# Patient Record
Sex: FEMALE | Race: ASIAN | NOT HISPANIC OR LATINO | Employment: OTHER | URBAN - METROPOLITAN AREA
[De-identification: names, ages, dates, MRNs, and addresses within clinical notes are randomized per-mention and may not be internally consistent; named-entity substitution may affect disease eponyms.]

---

## 2022-05-26 ENCOUNTER — OFFICE VISIT (OUTPATIENT)
Dept: DERMATOLOGY | Facility: CLINIC | Age: 67
End: 2022-05-26
Payer: MEDICARE

## 2022-05-26 VITALS — BODY MASS INDEX: 27.71 KG/M2 | WEIGHT: 132 LBS | HEIGHT: 58 IN | TEMPERATURE: 98 F

## 2022-05-26 DIAGNOSIS — M33.90 DERMATOMYOSITIS (HCC): ICD-10-CM

## 2022-05-26 DIAGNOSIS — D22.9 MULTIPLE MELANOCYTIC NEVI: Primary | ICD-10-CM

## 2022-05-26 DIAGNOSIS — D18.01 CHERRY ANGIOMA: ICD-10-CM

## 2022-05-26 DIAGNOSIS — R21 RASH: ICD-10-CM

## 2022-05-26 PROCEDURE — 99203 OFFICE O/P NEW LOW 30 MIN: CPT | Performed by: DERMATOLOGY

## 2022-05-26 RX ORDER — LEVOTHYROXINE SODIUM 0.12 MG/1
125 TABLET ORAL DAILY
COMMUNITY
Start: 2022-04-18 | End: 2022-07-17

## 2022-05-26 RX ORDER — CLOBETASOL PROPIONATE 0.46 MG/ML
SOLUTION TOPICAL
COMMUNITY
Start: 2021-11-30 | End: 2022-08-10

## 2022-05-26 RX ORDER — RALOXIFENE HYDROCHLORIDE 60 MG/1
60 TABLET, FILM COATED ORAL DAILY
COMMUNITY
Start: 2022-02-18

## 2022-05-26 NOTE — PROGRESS NOTES
[facility] Note     Patient Name: Destiny Bhatti  Encounter Date: 5/26/2022     Have you been cared for by a [facility] in the last 3 years and, if so, which one? No    · Have you traveled outside of the [address] [place] [address] in the past 3 months or outside of the [facility] area in the last 2 weeks? No     May we call your Preferred Phone number to discuss your specific medical information? Yes     May we leave a detailed message that includes your specific medical information? Yes      Today's Chief Concerns:   Concern #1:  Face rash   Concern #2:      Past Medical History:  Have you personally ever had or currently have any of the following? · Skin cancer (such as Melanoma, Basal Cell Carcinoma, Squamous Cell Carcinoma? (If Yes, please provide more detail)- No  · Eczema: YES  · Psoriasis: No  · HIV/AIDS: No  · Hepatitis B or C: No  · Tuberculosis: No  · Systemic Immunosuppression such as Diabetes, Biologic or Immunotherapy, Chemotherapy, Organ Transplantation, Bone Marrow Transplantation (If YES, please provide more detail): No  · Radiation Treatment (If YES, please provide more detail): No  · Any other major medical conditions/concerns? (If Yes, which types)- No    Social History:     What is/was your primary occupation?       What are your hobbies/past-times? Family History:  Have any of your "first degree relatives" (parent, brother, sister, or child) had any of the following       · Skin cancer such as Melanoma or Merkel Cell Carcinoma or Pancreatic Cancer? No  · Eczema, Asthma, Hay Fever or Seasonal Allergies: No  · Psoriasis or Psoriatic Arthritis: No  · Do any other medical conditions seem to run in your family? If Yes, what condition and which relatives?   No    Current Medications:   (please update all dermatological medications before printing patient's AVS!)      Current Outpatient Medications:     clobetasol (TEMOVATE) 0 05 % external solution, , Disp: , Rfl:     levothyroxine 125 mcg tablet, Take 125 mcg by mouth daily, Disp: , Rfl:     raloxifene (EVISTA) 60 mg tablet, Take 60 mg by mouth daily, Disp: , Rfl:       Review of Systems:  Have you recently had or currently have any of the following? If YES, what are you doing for the problem? · Fever, chills or unintended weight loss: No  · Sudden loss or change in your vision: No  · Nausea, vomiting or blood in your stool: No  · Painful or swollen joints: No  · Wheezing or cough: No  · Changing mole or non-healing wound: No  · Nosebleeds: No  · Excessive sweating: No  · Easy or prolonged bleeding? No  · Over the last 2 weeks, how often have you been bothered by the following problems? · Taking little interest or pleasure in doing things: 1 - Not at All  · Feeling down, depressed, or hopeless: 1 - Not at All  · Rapid heartbeat with epinephrine:  No    · FEMALES ONLY:    · Are you pregnant or planning to become pregnant? No  · Are you currently or planning to be nursing or breast feeding? N/A    · Any known allergies? No Known Allergies      Physical Exam:     Was a chaperone (Derm Clinical Assistant) present throughout the entire Physical Exam? Yes     Did the Dermatology Team specifically  the patient on the importance of a Full Skin Exam to be sure that nothing is missed clinically?  Yes}  o Did the patient ultimately request or accept a Full Skin Exam?  Yes  o Did the patient specifically refuse to have the areas "under-the-bra" examined by the Dermatologist? No  o Did the patient specifically refuse to have the areas "under-the-underwear" examined by the Dermatologist? No    CONSTITUTIONAL:   Vitals:    05/26/22 1333   Temp: 98 °F (36 7 °C)   Weight: 59 9 kg (132 lb)   Height: 4' 10" (1 473 m)       PSYCH: Normal mood and affect  EYES: Normal conjunctiva  ENT: Normal lips and oral mucosa  CARDIOVASCULAR: No edema  RESPIRATORY: Normal respirations  HEME/LYMPH/IMMUNO: No regional lymphadenopathy except as noted below in "ASSESSMENT AND PLAN BY DIAGNOSIS"    SKIN:  FULL ORGAN SYSTEM EXAM   Hair, Scalp, Ears, Face Normal except as noted below in Assessment   Neck, Cervical Chain Nodes Normal except as noted below in Assessment   Right Arm/Hand/Fingers Normal except as noted below in Assessment   Left Arm/Hand/Fingers Normal except as noted below in Assessment   Chest/Axillae Viewed areas Normal except as noted below in Assessment   Abdomen, Umbilicus Normal except as noted below in Assessment   Back/Spine Normal except as noted below in Assessment   Groin/Genitalia/Buttocks NOT EXAMINED   Right Leg, Foot, Toes Normal except as noted below in Assessment   Left Leg, Foot, Toes Normal except as noted below in Assessment        Assessment and Plan by Diagnosis:    History of Present Condition:     Duration:  How long has this been an issue for you?    o  2 years   Location Affected:  Where on the body is this affecting you?    o  face, neck, back   Quality:  Is there any bleeding, pain, itch, burning/irritation, or redness associated with the skin lesion? o  bleeding, redness, irritation, itchy   Severity:  Describe any bleeding, pain, itch, burning/irritation, or redness on a scale of 1 to 10 (with 10 being the worst)  o  N/A   Timing:  Does this condition seem to be there pretty constantly or do you notice it more at specific times throughout the day?     o  comes and goes   Context:  Have you ever noticed that this condition seems to be associated with specific activities you do?    o  Eczema    Modifying Factors:    o Anything that seems to make the condition worse?    -  unsure   o What have you tried to do to make the condition better?    -  Triamcinolone, Hydrocortisone, Epiceram, Cerave, Cetaphil, Clobetasol solution, Primecrolimus cream, Doxycycline, Vanicream, Smartlotion, Eucrisa, Dupixent    Associated Signs and Symptoms:  Does this skin lesion seem to be associated with any of the following:  o  SL AMB DERM SIGNS AND SYMPTOMS: Redness     MELANOCYTIC NEVI ("Moles")    Physical Exam:   Anatomic Location Affected:   Mostly on sun-exposed areas of the trunk and extremities   Morphological Description:  Scattered, 1-4mm round to ovoid, symmetrical-appearing, even bordered, skin colored to dark brown macules/papules, mostly in sun-exposed areas   Pertinent Positives:   Pertinent Negatives: Additional History of Present Condition:      Assessment and Plan:  Based on a thorough discussion of this condition and the management approach to it (including a comprehensive discussion of the known risks, side effects and potential benefits of treatment), the patient (family) agrees to implement the following specific plan:   When outside we recommend using a wide brim hat, sunglasses, long sleeve and pants, sunscreen with SPF 50+ with reapplication every 2 hours, or SPF specific clothing    Benign, reassured   Annual skin check     Melanocytic Nevi  Melanocytic nevi ("moles") are tan or brown, raised or flat areas of the skin which have an increased number of melanocytes  Melanocytes are the cells in our body which make pigment and account for skin color  Some moles are present at birth (I e , "congenital nevi"), while others come up later in life (i e , "acquired nevi")  The sun can stimulate the body to make more moles  Sunburns are not the only thing that triggers more moles  Chronic sun exposure can do it too  Clinically distinguishing a healthy mole from melanoma may be difficult, even for experienced dermatologists  The "ABCDE's" of moles have been suggested as a means of helping to alert a person to a suspicious mole and the possible increased risk of melanoma  The suggestions for raising alert are as follows:    Asymmetry: Healthy moles tend to be symmetric, while melanomas are often asymmetric    Asymmetry means if you draw a line through the mole, the two halves do not match in color, size, shape, or surface texture  Asymmetry can be a result of rapid enlargement of a mole, the development of a raised area on a previously flat lesion, scaling, ulceration, bleeding or scabbing within the mole  Any mole that starts to demonstrate "asymmetry" should be examined promptly by a board certified dermatologist      Border: Healthy moles tend to have discrete, even borders  The border of a melanoma often blends into the normal skin and does not sharply delineate the mole from normal skin  Any mole that starts to demonstrate "uneven borders" should be examined promptly by a board certified dermatologist      Color: Healthy moles tend to be one color throughout  Melanomas tend to be made up of different colors ranging from dark black, blue, white, or red  Any mole that demonstrates a color change should be examined promptly by a board certified dermatologist      Diameter: Healthy moles tend to be smaller than 0 6 cm in size; an exception are "congenital nevi" that can be larger  Melanomas tend to grow and can often be greater than 0 6 cm (1/4 of an inch, or the size of a pencil eraser)  This is only a guideline, and many normal moles may be larger than 0 6 cm without being unhealthy  Any mole that starts to change in size (small to bigger or bigger to smaller) should be examined promptly by a board certified dermatologist      Evolving: Healthy moles tend to "stay the same "  Melanomas may often show signs of change or evolution such as a change in size, shape, color, or elevation  Any mole that starts to itch, bleed, crust, burn, hurt, or ulcerate or demonstrate a change or evolution should be examined promptly by a board certified dermatologist         Jolene Hassan    Physical Exam:   Anatomic Location Affected:  trunk   Morphological Description:  Scattered cherry red, 1-4 mm papules   Pertinent Positives:   Pertinent Negatives:     Additional History of Present Condition:      Assessment and Plan:  Based on a thorough discussion of this condition and the management approach to it (including a comprehensive discussion of the known risks, side effects and potential benefits of treatment), the patient (family) agrees to implement the following specific plan:   Monitor for changes   Benign, reassured    Assessment and Plan:    Cherry angioma, also known as Jazmyn Vazquez spots, are benign vascular skin lesions  A "cherry angioma" is a firm red, blue or purple papule, 0 1-1 cm in diameter  When thrombosed, they can appear black in colour until evaluated with a dermatoscope when the red or purple colour is more easily seen  Cherry angioma may develop on any part of the body but most often appear on the scalp, face, lips and trunk  An angioma is due to proliferating endothelial cells; these are the cells that line the inside of a blood vessel  Angiomas can arise in early life or later in life; the most common type of angioma is a cherry angioma  Cherry angiomas are very common in males and females of any age or race  They are more noticeable in white skin than in skin of colour  They markedly increase in number from about the age of 36  There may be a family history of similar lesions  Eruptive cherry angiomas have been rarely reported to be associated with internal malignancy  The cause of angiomas is unknown  Genetic analysis of cherry angiomas has shown that they frequently carry specific somatic missense mutations in the GNAQ and GNA11 (Q209H) genes, which are involved in other vascular and melanocytic proliferations  RASH/ POSSIBLE DERMATOMYOSITIS     Physical Exam:   (Anatomic Location); (Size and Morphological Description); (Differential Diagnosis):  o A; Face, neck, arms with  blanching red patches, papules on knukles   Pertinent Positives: Pictures taken today in the office      Pertinent Negatives: no muscle weakness noted    Additional History of Present Condition:  Present for two years  Currently located on face, neck  Described as red, irritated and itchy  Treatments have included: Dupixent, triamcinolone 0 1% cream, hydrocortisone 2 5% Primecrolimus cream, doxycycline, Epiceran, Desoride, Vanicream, Cerave, Cetaphil, Eucresa 2% ointment, smartlotion, clobetasol solution  Patient washes her body with Dove with cucumber  Patient has also had patch testing done which was inconclusive  Assessment and Plan:  Based on a thorough discussion of this condition and the management approach to it (including a comprehensive discussion of the known risks, side effects and potential benefits of treatment), the patient (family) agrees to implement the following specific plan:   Complete ordered blood work that was ordered for you today   At this time we recommend that you continue the same topical regimen that you are currently doing  You may also continue the [address] as prescribed      Uptodate on routine cancer screenings                    Scribe Attestation    I,:  Angelita Simental am acting as a scribe while in the presence of the attending physician :       I,:  Silke Ramey MD personally performed the services described in this documentation    as scribed in my presence :

## 2022-05-26 NOTE — PATIENT INSTRUCTIONS
MELANOCYTIC NEVI ("Moles")    Assessment and Plan:  Based on a thorough discussion of this condition and the management approach to it (including a comprehensive discussion of the known risks, side effects and potential benefits of treatment), the patient (family) agrees to implement the following specific plan:  When outside we recommend using a wide brim hat, sunglasses, long sleeve and pants, sunscreen with SPF 72+ with reapplication every 2 hours, or SPF specific clothing   Benign, reassured  Annual skin check     Melanocytic Nevi  Melanocytic nevi ("moles") are tan or brown, raised or flat areas of the skin which have an increased number of melanocytes  Melanocytes are the cells in our body which make pigment and account for skin color  Some moles are present at birth (I e , "congenital nevi"), while others come up later in life (i e , "acquired nevi")  The sun can stimulate the body to make more moles  Sunburns are not the only thing that triggers more moles  Chronic sun exposure can do it too  Clinically distinguishing a healthy mole from melanoma may be difficult, even for experienced dermatologists  The "ABCDE's" of moles have been suggested as a means of helping to alert a person to a suspicious mole and the possible increased risk of melanoma  The suggestions for raising alert are as follows:    Asymmetry: Healthy moles tend to be symmetric, while melanomas are often asymmetric  Asymmetry means if you draw a line through the mole, the two halves do not match in color, size, shape, or surface texture  Asymmetry can be a result of rapid enlargement of a mole, the development of a raised area on a previously flat lesion, scaling, ulceration, bleeding or scabbing within the mole  Any mole that starts to demonstrate "asymmetry" should be examined promptly by a board certified dermatologist      Border: Healthy moles tend to have discrete, even borders    The border of a melanoma often blends into the normal skin and does not sharply delineate the mole from normal skin  Any mole that starts to demonstrate "uneven borders" should be examined promptly by a board certified dermatologist      Color: Healthy moles tend to be one color throughout  Melanomas tend to be made up of different colors ranging from dark black, blue, white, or red  Any mole that demonstrates a color change should be examined promptly by a board certified dermatologist      Diameter: Healthy moles tend to be smaller than 0 6 cm in size; an exception are "congenital nevi" that can be larger  Melanomas tend to grow and can often be greater than 0 6 cm (1/4 of an inch, or the size of a pencil eraser)  This is only a guideline, and many normal moles may be larger than 0 6 cm without being unhealthy  Any mole that starts to change in size (small to bigger or bigger to smaller) should be examined promptly by a board certified dermatologist      Evolving: Healthy moles tend to "stay the same "  Melanomas may often show signs of change or evolution such as a change in size, shape, color, or elevation  Any mole that starts to itch, bleed, crust, burn, hurt, or ulcerate or demonstrate a change or evolution should be examined promptly by a board certified dermatologist         PETERSEN ANGIOMAS    Assessment and Plan:  Based on a thorough discussion of this condition and the management approach to it (including a comprehensive discussion of the known risks, side effects and potential benefits of treatment), the patient (family) agrees to implement the following specific plan:  Monitor for changes  Benign, reassured    Assessment and Plan:    Cherry angioma, also known as Albino Linda spots, are benign vascular skin lesions  A "cherry angioma" is a firm red, blue or purple papule, 0 1-1 cm in diameter  When thrombosed, they can appear black in colour until evaluated with a dermatoscope when the red or purple colour is more easily seen   [place] Meredith angioma may develop on any part of the body but most often appear on the scalp, face, lips and trunk  An angioma is due to proliferating endothelial cells; these are the cells that line the inside of a blood vessel  Angiomas can arise in early life or later in life; the most common type of angioma is a cherry angioma  Cherry angiomas are very common in males and females of any age or race  They are more noticeable in white skin than in skin of colour  They markedly increase in number from about the age of 36  There may be a family history of similar lesions  Eruptive cherry angiomas have been rarely reported to be associated with internal malignancy  The cause of angiomas is unknown  Genetic analysis of cherry angiomas has shown that they frequently carry specific somatic missense mutations in the GNAQ and GNA11 (Q209H) genes, which are involved in other vascular and melanocytic proliferations  RASH/ POSSIBLE DERMATOMYOSITIS     Assessment and Plan:  Based on a thorough discussion of this condition and the management approach to it (including a comprehensive discussion of the known risks, side effects and potential benefits of treatment), the patient (family) agrees to implement the following specific plan:  Complete ordered blood work that was ordered for you today  At this time we recommend that you continue the same topical regimen that you are currently doing  You may also continue the [address] as prescribed

## 2022-06-21 LAB
ALDOLASE SERPL-CCNC: 5.4 U/L
ANA SER QL IF: NEGATIVE
CK SERPL-CCNC: 57 U/L (ref 29–143)
CRP SERPL-MCNC: 2.5 MG/L
EJ AB SER QL: NOT DETECTED
ENA JO1 AB SER IA-ACNC: NORMAL AI
KU AB SER QL: NOT DETECTED
LDH1 CFR SERPL ELPH: 20 % (ref 19–38)
LDH2 CFR SERPL ELPH: 36 % (ref 30–43)
LDH3 CFR SERPL ELPH: 28 % (ref 16–26)
LDH4 CFR SERPL ELPH: 12 % (ref 3–12)
LDH5 CFR SERPL ELPH: 4 % (ref 3–14)
MI2 AB SER QL: NOT DETECTED
OJ AB SER QL: NOT DETECTED
PL12 AB SER QL: NOT DETECTED
PL7 AB SER QL: NOT DETECTED
SRP AB SERPL QL: NOT DETECTED

## 2022-06-30 ENCOUNTER — OFFICE VISIT (OUTPATIENT)
Dept: DERMATOLOGY | Facility: CLINIC | Age: 67
End: 2022-06-30
Payer: MEDICARE

## 2022-06-30 DIAGNOSIS — R21 RASH AND NONSPECIFIC SKIN ERUPTION: Primary | ICD-10-CM

## 2022-06-30 PROCEDURE — 88312 SPECIAL STAINS GROUP 1: CPT | Performed by: STUDENT IN AN ORGANIZED HEALTH CARE EDUCATION/TRAINING PROGRAM

## 2022-06-30 PROCEDURE — 11105 PUNCH BX SKIN EA SEP/ADDL: CPT | Performed by: DERMATOLOGY

## 2022-06-30 PROCEDURE — 11104 PUNCH BX SKIN SINGLE LESION: CPT | Performed by: DERMATOLOGY

## 2022-06-30 PROCEDURE — 88350 IMFLUOR EA ADDL 1ANTB STN PX: CPT | Performed by: STUDENT IN AN ORGANIZED HEALTH CARE EDUCATION/TRAINING PROGRAM

## 2022-06-30 PROCEDURE — 88305 TISSUE EXAM BY PATHOLOGIST: CPT | Performed by: STUDENT IN AN ORGANIZED HEALTH CARE EDUCATION/TRAINING PROGRAM

## 2022-06-30 PROCEDURE — 88300 SURGICAL PATH GROSS: CPT | Performed by: STUDENT IN AN ORGANIZED HEALTH CARE EDUCATION/TRAINING PROGRAM

## 2022-06-30 PROCEDURE — 88313 SPECIAL STAINS GROUP 2: CPT | Performed by: STUDENT IN AN ORGANIZED HEALTH CARE EDUCATION/TRAINING PROGRAM

## 2022-06-30 PROCEDURE — 88346 IMFLUOR 1ST 1ANTB STAIN PX: CPT | Performed by: DERMATOLOGY

## 2022-06-30 NOTE — PROGRESS NOTES
PROCEDURE NOTE:  PUNCH BIOPSY      Performing Physician: Gurjit Abdi, supervised by Dr Fly Staley  Anatomic Location; Clinical Description with size (cm); Pre-Op Diagnosis:      SPECIMEN A; Skin; Anatomic Location: Right 3rd DIP; Procedure/Protocol: Skin Specimen (submit in [facility]):Punch Biopsy (when a punch biopsy tool is used; simple closure is included) (CPT 31479; each additional punch biopsy is C PT 26024) 73y o  year old  Female with a Morphological Description: pink nodules on interphalangeal joints and violaceous patches on face; patient has history of muscle aches Differential Diagnosis and/or Specific Clinical Question: concern for Gottron's papules/ dermatomyositis       SPECIMEN B; Skin; Anatomic Location: Right 3rd DIP; Procedure/Protocol: Skin Specimen (submit in [facility]):Punch Biopsy (when a punch biopsy tool is used; simple closure is included) (CPT 00670; each additional punch biopsy is CPT 63867) 73y o  year old  Female with a Morphological Description: pink nodules on interphalangeal joints and violaceous patches on faceDifferential Diagnosis and/or Specific Clinical Question: concern for Gottron's papules/ dermatomyositis SEND FOR DIF                     Anesthesia: 1% xylocaine with epi       Topical anesthesia: None       Indications: To indicate diagnosis and management plan  Procedure Details     Patient informed of the risks (including bleeding,scaring and infection) and benefits of the procedure explained  Verbal and written informed consent obtained  The area was prepped and draped in the usual fashion  Anesthesia was obtained with 1% lidocaine with epinephrine  The skin was then stretched perpendicular to the skin tension lines and a punch biopsy to an appropriate sampling depth was obtained with a 4 mm punch with a forceps and iris scissors  Hemostasis was obtained with 5-0 Ethilon x 2 sutures in each spot        Complications:  None      Specimen has been sent for review by Dermatopathology  Plan:  1  Instructed to keep the wound dry and covered for 24-48h and clean thereafter  2  Warning signs of infection were reviewed  3  Recommended that the patient use acetaminophen as needed for pain  4  Sutures if any should be removed in 10 days      Standard post-procedure care has been explained and has been included in written form within the patient's copy of Informed Consent  The patient was seen and discussed with Dr Jeff Nurse       RTC: will call patient with biopsy results and schedule next steps in treatment accordingly     Wade Barreto  Dermatology PGY-3 Resident Physician

## 2022-06-30 NOTE — PATIENT INSTRUCTIONS
Plan:  1  Instructed to keep the wound dry and covered for 24-48h and clean thereafter  2  Warning signs of infection were reviewed  3  Recommended that the patient use acetaminophen as needed for pain  4   Sutures if any should be removed in 10 days    VaniCream sunscreen SPF 50+

## 2022-07-12 LAB — MISCELLANEOUS LAB TEST RESULT: NORMAL

## 2022-07-14 ENCOUNTER — TELEPHONE (OUTPATIENT)
Dept: DERMATOLOGY | Facility: CLINIC | Age: 67
End: 2022-07-14

## 2022-07-20 ENCOUNTER — TELEPHONE (OUTPATIENT)
Dept: DERMATOLOGY | Facility: CLINIC | Age: 67
End: 2022-07-20

## 2022-07-20 DIAGNOSIS — M33.90 DERMATOMYOSITIS (HCC): Primary | ICD-10-CM

## 2022-07-20 RX ORDER — HYDROXYCHLOROQUINE SULFATE 200 MG/1
200 TABLET, FILM COATED ORAL 2 TIMES DAILY WITH MEALS
Qty: 180 TABLET | Refills: 0 | Status: SHIPPED | OUTPATIENT
Start: 2022-07-20 | End: 2022-11-18

## 2022-08-10 DIAGNOSIS — M33.90 DERMATOMYOSITIS (HCC): ICD-10-CM

## 2022-08-10 RX ORDER — CLOBETASOL PROPIONATE 0.5 MG/G
OINTMENT TOPICAL 2 TIMES DAILY
Qty: 60 G | Refills: 0 | Status: CANCELLED | OUTPATIENT
Start: 2022-08-10

## 2022-08-10 RX ORDER — BETAMETHASONE DIPROPIONATE 0.5 MG/G
OINTMENT TOPICAL
Qty: 30 G | Refills: 0 | OUTPATIENT
Start: 2022-08-10

## 2022-08-10 RX ORDER — MOMETASONE FUROATE 1 MG/ML
SOLUTION TOPICAL
Qty: 60 ML | Refills: 0 | OUTPATIENT
Start: 2022-08-10

## 2022-08-10 RX ORDER — CLOBETASOL PROPIONATE 0.46 MG/ML
SOLUTION TOPICAL 2 TIMES DAILY
Qty: 50 ML | Refills: 0 | Status: CANCELLED | OUTPATIENT
Start: 2022-08-10

## 2022-08-15 ENCOUNTER — TELEPHONE (OUTPATIENT)
Dept: DERMATOLOGY | Facility: CLINIC | Age: 67
End: 2022-08-15

## 2022-08-15 NOTE — TELEPHONE ENCOUNTER
In touch with patient on 8/14/22  Developing new onset rash, pruritic, on face and trunk  Stop plaquneil  Triamcinolone 0 1% 2x/day  Clinic this week, likely biopsy  Will need to prescribe replacement for plaquenil  Please schedule for 8/16 at 10:30 in [place] with me  Overbook   OVS- rash, punch biopsy

## 2022-08-16 ENCOUNTER — OFFICE VISIT (OUTPATIENT)
Dept: DERMATOLOGY | Facility: CLINIC | Age: 67
End: 2022-08-16
Payer: MEDICARE

## 2022-08-16 VITALS — WEIGHT: 130 LBS | TEMPERATURE: 98.2 F | HEIGHT: 58 IN | BODY MASS INDEX: 27.29 KG/M2

## 2022-08-16 DIAGNOSIS — R21 RASH: ICD-10-CM

## 2022-08-16 DIAGNOSIS — M33.90 DERMATOMYOSITIS (HCC): Primary | ICD-10-CM

## 2022-08-16 PROCEDURE — 88350 IMFLUOR EA ADDL 1ANTB STN PX: CPT | Performed by: STUDENT IN AN ORGANIZED HEALTH CARE EDUCATION/TRAINING PROGRAM

## 2022-08-16 PROCEDURE — 88313 SPECIAL STAINS GROUP 2: CPT | Performed by: STUDENT IN AN ORGANIZED HEALTH CARE EDUCATION/TRAINING PROGRAM

## 2022-08-16 PROCEDURE — 88300 SURGICAL PATH GROSS: CPT | Performed by: STUDENT IN AN ORGANIZED HEALTH CARE EDUCATION/TRAINING PROGRAM

## 2022-08-16 PROCEDURE — 11104 PUNCH BX SKIN SINGLE LESION: CPT | Performed by: DERMATOLOGY

## 2022-08-16 PROCEDURE — 88305 TISSUE EXAM BY PATHOLOGIST: CPT | Performed by: STUDENT IN AN ORGANIZED HEALTH CARE EDUCATION/TRAINING PROGRAM

## 2022-08-16 PROCEDURE — 88346 IMFLUOR 1ST 1ANTB STAIN PX: CPT | Performed by: DERMATOLOGY

## 2022-08-16 PROCEDURE — 11105 PUNCH BX SKIN EA SEP/ADDL: CPT | Performed by: DERMATOLOGY

## 2022-08-16 PROCEDURE — 99213 OFFICE O/P EST LOW 20 MIN: CPT | Performed by: DERMATOLOGY

## 2022-08-16 RX ORDER — HYDROCORTISONE 25 MG/ML
1 LOTION TOPICAL 2 TIMES DAILY
COMMUNITY

## 2022-08-16 RX ORDER — PREDNISONE 20 MG/1
TABLET ORAL
Qty: 33 TABLET | Refills: 0 | Status: SHIPPED | OUTPATIENT
Start: 2022-08-16 | End: 2022-09-05

## 2022-08-16 RX ORDER — TRIAMCINOLONE ACETONIDE 1 MG/G
CREAM TOPICAL
COMMUNITY
Start: 2022-08-14

## 2022-08-16 NOTE — PATIENT INSTRUCTIONS
Assessment and Plan:  Based on a thorough discussion of this condition and the management approach to it (including a comprehensive discussion of the known risks, side effects and potential benefits of treatment), the patient (family) agrees to implement the following specific plan:  Punch biopsy done in office today   Start prednisone take 60 mg for 5 days, then 40 mg for 5 days, then 20 mg for 5 days, then 10 mg for 5 days  Remove sutures in 14 days     Plan:  1  Instructed to keep the wound dry and covered for 24-48h and clean thereafter  2  Warning signs of infection were reviewed  3  Recommended that the patient use acetaminophen as needed for pain  4  Sutures if any should be removed in 14 days      Standard post-procedure care has been explained and has been included in written form within the patient's copy of Informed Consent

## 2022-08-16 NOTE — PROGRESS NOTES
[facility] Follow Up Note    Patient Name: Sonny Hashimoto  Encounter Date: 08/16/2022    Today's Chief Concerns:  [facility] Concern #1:  Rash       Current Medications:    Current Outpatient Medications:     betamethasone, augmented, (DIPROLENE) 0 05 % ointment, Apply topically 2 (two) times a day To fingers as needed, Disp: 50 g, Rfl: 0    clobetasol (TEMOVATE) 0 05 % external solution, Apply topically 2 (two) times a day To scalp as needed, Disp: 50 mL, Rfl: 0    hydrocortisone 2 5 % lotion, Apply 1 application topically 2 (two) times a day, Disp: , Rfl:     levothyroxine 125 mcg tablet, Take 125 mcg by mouth daily, Disp: , Rfl:     raloxifene (EVISTA) 60 mg tablet, Take 60 mg by mouth daily, Disp: , Rfl:     triamcinolone (KENALOG) 0 1 % ointment, Apply 1 application topically 2 (two) times a day To rash on face and body, Disp: 454 g, Rfl: 1    hydroxychloroquine (PLAQUENIL) 200 mg tablet, Take 1 tablet (200 mg total) by mouth 2 (two) times a day with meals (Patient not taking: Reported on 8/16/2022), Disp: 180 tablet, Rfl: 0    triamcinolone (KENALOG) 0 1 % cream, , Disp: , Rfl:     CONSTITUTIONAL:   Vitals:    08/16/22 1045   Temp: 98 2 °F (36 8 °C)   Weight: 59 kg (130 lb)   Height: 4' 10" (1 473 m)             Specific Alerts:    Have you been seen by a [facility] Dermatologist in the last 3 years? YES    Are you pregnant or planning to become pregnant? No    Are you currently or planning to be nursing or breast feeding? No    No Known Allergies    May we call your Preferred Phone number to discuss your specific medical information? YES    May we leave a detailed message that includes your specific medical information? YES    Have you traveled outside of the [facility] in the past 3 months? No    Do you currently have a pacemaker or defibrillator?  No    Do you have any artificial heart valves, joints, plates, screws, rods, stents, pins, etc? No   - If Yes, were any placed within the last 2 years? Do you require any medications prior to a surgical procedure? No   - If Yes, for which procedure? N/a    - If Yes, what medications to you require? N/a     Are you taking any medications that cause you to bleed more easily ("blood thinners") No    Have you ever experienced a rapid heartbeat with epinephrine? No    Have you ever been treated with "gold" (gold sodium thiomalate) therapy? No    [facility] can help with wrinkles, "laugh lines," facial volume loss, "double chin," "love handles," age spots, and more  Are you interested in learning today about some of the skin enhancement procedures that we offer? (If Yes, please provide more detail) No    Review of Systems:  Have you recently had or currently have any of the following?     · Fever or chills: No  · Night Sweats: No  · Headaches: No  · Weight Gain: No  · Weight Loss: No  · Blurry Vision: No  · Nausea: No  · Vomiting: No  · Diarrhea: No  · Blood in Stool: No  · Abdominal Pain: No  · Itchy Skin: YES  · Painful Joints: No  · Swollen Joints: No  · Muscle Pain: No  · Irregular Mole: No  · Sun Burn: No  · Dry Skin: YES  · Skin Color Changes: No  · Scar or Keloid: No  · Cold Sores/Fever Blisters: No  · Bacterial Infections/MRSA: No  · Anxiety: No  · Depression: No  · Suicidal or Homicidal Thoughts: No      PSYCH: Normal mood and affect  EYES: Normal conjunctiva  ENT: Normal lips and oral mucosa  CARDIOVASCULAR: No edema  RESPIRATORY: Normal respirations  HEME/LYMPH/IMMUNO:  No regional lymphadenopathy except as noted below in ASSESSMENT AND PLAN BY DIAGNOSIS    FULL ORGAN SYSTEM SKIN EXAM (SKIN)   Hair, Scalp, Ears, Face Normal except as noted below in Assessment   Neck, Cervical Chain Nodes Normal except as noted below in Assessment   Right Arm/Hand/Fingers Normal except as noted below in Assessment   Left Arm/Hand/Fingers Normal except as noted below in Assessment   Chest/Axillae Viewed areas Normal except as noted below in Assessment   Abdomen, Umbilicus Normal except as noted below in Assessment   Back/Spine Normal except as noted below in Assessment   Groin/Genitalia/Buttocks    Right Leg, Foot, Toes Normal except as noted below in Assessment   Left Leg, Foot, Toes Normal except as noted below in Assessment       RASH (SUSPECT DRUG REACTION VS WORSENING DERMATOMYOSITIS) on back > chest, arms, legs    Physical Exam:   (Anatomic Location); (Size and Morphological Description); (Differential Diagnosis):  · A: Left lateral thigh; erythematous blanching papules with some coalescing into larger plaques; Differential Diagnosis and/or Specific Clinical Question:drug eruption versus dermatomyositis  · B: Left medial thigh; erythematous blanching papules with some coalescing into larger plaques; Differential Diagnosis and/or Specific Clinical Question:drug eruption versus dermatomyositis    Pertinent Positives:   Pertinent Negatives: Additional History of Present Condition:     Assessment and Plan:  Based on a thorough discussion of this condition and the management approach to it (including a comprehensive discussion of the known risks, side effects and potential benefits of treatment), the patient (family) agrees to implement the following specific plan:   Punch biopsy done in office today  Written consent obtained from patient  Will call with biopsy results   Start prednisone taper: take 60 mg for 5 days, then 40 mg for 5 days, then 20 mg for 5 days, then 10 mg for 5 days   Continue with topical triamcinolone 0 1% ointment   Stopped plaquenil   Can consider starting methotrexate vs cyclosporine vs mycophenolate mofetil pending biopsy results      Remove sutures in 14 days     PROCEDURE NOTE:  PUNCH BIOPSY      Performing Physician: Dr Jo    Anatomic Location; Clinical Description with size (cm); Pre-Op Diagnosis:    A; right thigh HE; erythematous blanching papules; diffdx; drug irruption versus dermatomyositis  B; right thigh DIF; erythematous blanching papules; diffdx; drug irruption versus dermatomyositis        Anesthesia: 1% Lidocaine HCL      Topical anesthesia: None       Indications: To indicate diagnosis and management plan  Procedure Details     Patient informed of the risks (including bleeding,scaring and infection) and benefits of the procedure explained  Verbal and written informed consent obtained  The area was prepped and draped in the usual fashion  Anesthesia was obtained with 1% lidocaine with epinephrine  The skin was then stretched perpendicular to the skin tension lines and a punch biopsy to an appropriate sampling depth was obtained with a 4 mm punch with a forceps and iris scissors  Hemostasis was obtained with 4-0 Ethilon x 2 sutures  Complications:  None      Specimen has been sent for review by Dermatopathology  Plan:  1  Instructed to keep the wound dry and covered for 24-48h and clean thereafter  2  Warning signs of infection were reviewed  3  Recommended that the patient use acetaminophen as needed for pain  4  Sutures if any should be removed in 14 days      Standard post-procedure care has been explained and has been included in written form within the patient's copy of Informed Consent          Scribe Attestation    I,:  Jose A Pérez MA am acting as a scribe while in the presence of the attending physician :       I,:  Janie Lynn DO personally performed the services described in this documentation    as scribed in my presence :       Gigi Peralta DO

## 2022-08-26 ENCOUNTER — TELEPHONE (OUTPATIENT)
Dept: DERMATOLOGY | Facility: CLINIC | Age: 67
End: 2022-08-26

## 2022-08-26 DIAGNOSIS — M33.90 DERMATOMYOSITIS (HCC): Primary | ICD-10-CM

## 2022-08-26 NOTE — TELEPHONE ENCOUNTER
[place] of radiology called states before Ct scan can be performed they need a blood work order for bun and creatine can be faxed to [phone]

## 2022-09-09 ENCOUNTER — TELEPHONE (OUTPATIENT)
Dept: DERMATOLOGY | Age: 67
End: 2022-09-09

## 2022-09-09 NOTE — TELEPHONE ENCOUNTER
[facility] called to clarify miscellaneous test order they received     Informed him after reading order it is a myositis specific 11 antibody panel (test code) 08198

## 2022-09-15 LAB
BUN SERPL-MCNC: 18 MG/DL (ref 7–25)
BUN/CREAT SERPL: NORMAL (CALC) (ref 6–22)
CALCIUM SERPL-MCNC: 9.1 MG/DL (ref 8.6–10.4)
CHLORIDE SERPL-SCNC: 104 MMOL/L (ref 98–110)
CO2 SERPL-SCNC: 29 MMOL/L (ref 20–32)
CREAT SERPL-MCNC: 0.58 MG/DL (ref 0.5–1.05)
EJ AB SER QL: <11 SI
ENA JO1 AB SER QL IB: <11 SI
GFR/BSA.PRED SERPLBLD CYS-BASED-ARV: 100 ML/MIN/1.73M2
GLUCOSE SERPL-MCNC: 87 MG/DL (ref 65–99)
OJ AB SER QL IB: <11 SI
PL12 AB SER QL IB: <11 SI
PL7 AB SER QL IB: <11 SI
POTASSIUM SERPL-SCNC: 4.1 MMOL/L (ref 3.5–5.3)
SL AMB MDA-5 AB: <11 SI
SL AMB MI-2 ALPHA AB: <11 SI
SL AMB MI-2 BETA AB: <11 SI
SL AMB NXP-2 AB: <11 SI
SL AMB TIF-1Y AB: 37 SI
SODIUM SERPL-SCNC: 141 MMOL/L (ref 135–146)
SRP AB SERPL QL IB: 17 SI

## 2022-09-19 ENCOUNTER — TELEPHONE (OUTPATIENT)
Dept: DERMATOLOGY | Facility: CLINIC | Age: 67
End: 2022-09-19

## 2022-09-19 NOTE — TELEPHONE ENCOUNTER
Dr Rina Nagel, Patient is good to go for CT scan  No PA is needed with Medicare and [employer] follows Medicare guidelines   Thank you

## 2022-09-19 NOTE — TELEPHONE ENCOUNTER
Please assist for PA for CT chest abdomen pelvis with and without contrast  Diagnosis dermatomyositis, malignancy screening  She is scheduled for tomorrow am, please let me know if she needs to reschedule   Thanks so much!!

## 2022-09-27 ENCOUNTER — TELEPHONE (OUTPATIENT)
Dept: DERMATOLOGY | Facility: CLINIC | Age: 67
End: 2022-09-27

## 2022-09-27 NOTE — TELEPHONE ENCOUNTER
[facility] to get [facility] results  I can't see them for some reason

## 2022-09-28 ENCOUNTER — TELEPHONE (OUTPATIENT)
Dept: DERMATOLOGY | Facility: CLINIC | Age: 67
End: 2022-09-28

## 2022-09-28 NOTE — TELEPHONE ENCOUNTER
Called Pt to try to schedule appt for her and explain about the waitlist   Pt asked if Dr González Summers or the clinical staff could call her back and let her know if her ultrasound results have been obtained  I advised we were getting the CT results, however, she stated there are ultrasound results also  Pt would like a callback to schedule an appt as she stated Dr Dalia Ho wants her to come in in October to review both results  Not sure if we have results back and there are no appts available to schedule        Sent message to Dr Adrian Lerner and [facility]

## 2022-10-15 DIAGNOSIS — M33.90 DERMATOMYOSITIS (HCC): ICD-10-CM

## 2022-10-17 RX ORDER — HYDROXYCHLOROQUINE SULFATE 200 MG/1
TABLET, FILM COATED ORAL
Qty: 180 TABLET | Refills: 0 | OUTPATIENT
Start: 2022-10-17

## 2022-10-18 ENCOUNTER — APPOINTMENT (OUTPATIENT)
Dept: LAB | Facility: CLINIC | Age: 67
End: 2022-10-18
Payer: MEDICARE

## 2022-10-18 ENCOUNTER — OFFICE VISIT (OUTPATIENT)
Dept: DERMATOLOGY | Facility: CLINIC | Age: 67
End: 2022-10-18
Payer: MEDICARE

## 2022-10-18 VITALS — HEIGHT: 58 IN | WEIGHT: 130 LBS | BODY MASS INDEX: 27.29 KG/M2 | TEMPERATURE: 97.7 F

## 2022-10-18 DIAGNOSIS — Z11.59 ENCOUNTER FOR SCREENING FOR OTHER VIRAL DISEASES: ICD-10-CM

## 2022-10-18 DIAGNOSIS — M33.90 DERMATOMYOSITIS (HCC): Primary | ICD-10-CM

## 2022-10-18 DIAGNOSIS — M33.90 DERMATOMYOSITIS (HCC): ICD-10-CM

## 2022-10-18 DIAGNOSIS — Z79.899 HIGH RISK MEDICATION USE: Primary | ICD-10-CM

## 2022-10-18 LAB
ALBUMIN SERPL BCP-MCNC: 4.2 G/DL (ref 3.5–5)
ALP SERPL-CCNC: 49 U/L (ref 34–104)
ALT SERPL W P-5'-P-CCNC: 5 U/L (ref 7–52)
ANION GAP SERPL CALCULATED.3IONS-SCNC: 6 MMOL/L (ref 4–13)
AST SERPL W P-5'-P-CCNC: 17 U/L (ref 13–39)
BASOPHILS # BLD AUTO: 0.06 THOUSANDS/ΜL (ref 0–0.1)
BASOPHILS NFR BLD AUTO: 1 % (ref 0–1)
BILIRUB SERPL-MCNC: 0.26 MG/DL (ref 0.2–1)
BUN SERPL-MCNC: 17 MG/DL (ref 5–25)
CALCIUM SERPL-MCNC: 9.6 MG/DL (ref 8.4–10.2)
CHLORIDE SERPL-SCNC: 106 MMOL/L (ref 96–108)
CO2 SERPL-SCNC: 30 MMOL/L (ref 21–32)
CREAT SERPL-MCNC: 0.49 MG/DL (ref 0.6–1.3)
EOSINOPHIL # BLD AUTO: 0.29 THOUSAND/ΜL (ref 0–0.61)
EOSINOPHIL NFR BLD AUTO: 6 % (ref 0–6)
ERYTHROCYTE [DISTWIDTH] IN BLOOD BY AUTOMATED COUNT: 14.3 % (ref 11.6–15.1)
GFR SERPL CREATININE-BSD FRML MDRD: 102 ML/MIN/1.73SQ M
GLUCOSE SERPL-MCNC: 91 MG/DL (ref 65–140)
HBV CORE AB SER QL: NORMAL
HBV SURFACE AB SER-ACNC: <3.1 MIU/ML
HBV SURFACE AG SER QL: NORMAL
HCT VFR BLD AUTO: 43.6 % (ref 34.8–46.1)
HCV AB SER QL: NORMAL
HGB BLD-MCNC: 13.8 G/DL (ref 11.5–15.4)
IMM GRANULOCYTES # BLD AUTO: 0.03 THOUSAND/UL (ref 0–0.2)
IMM GRANULOCYTES NFR BLD AUTO: 1 % (ref 0–2)
LYMPHOCYTES # BLD AUTO: 1.21 THOUSANDS/ΜL (ref 0.6–4.47)
LYMPHOCYTES NFR BLD AUTO: 23 % (ref 14–44)
MCH RBC QN AUTO: 29.4 PG (ref 26.8–34.3)
MCHC RBC AUTO-ENTMCNC: 31.7 G/DL (ref 31.4–37.4)
MCV RBC AUTO: 93 FL (ref 82–98)
MONOCYTES # BLD AUTO: 0.53 THOUSAND/ΜL (ref 0.17–1.22)
MONOCYTES NFR BLD AUTO: 10 % (ref 4–12)
NEUTROPHILS # BLD AUTO: 3.06 THOUSANDS/ΜL (ref 1.85–7.62)
NEUTS SEG NFR BLD AUTO: 59 % (ref 43–75)
NRBC BLD AUTO-RTO: 0 /100 WBCS
PLATELET # BLD AUTO: 289 THOUSANDS/UL (ref 149–390)
PMV BLD AUTO: 10.5 FL (ref 8.9–12.7)
POTASSIUM SERPL-SCNC: 3.9 MMOL/L (ref 3.5–5.3)
PROT SERPL-MCNC: 7.3 G/DL (ref 6.4–8.4)
RBC # BLD AUTO: 4.69 MILLION/UL (ref 3.81–5.12)
SODIUM SERPL-SCNC: 142 MMOL/L (ref 135–147)
WBC # BLD AUTO: 5.18 THOUSAND/UL (ref 4.31–10.16)

## 2022-10-18 PROCEDURE — 86704 HEP B CORE ANTIBODY TOTAL: CPT

## 2022-10-18 PROCEDURE — 99213 OFFICE O/P EST LOW 20 MIN: CPT | Performed by: DERMATOLOGY

## 2022-10-18 PROCEDURE — 85025 COMPLETE CBC W/AUTO DIFF WBC: CPT | Performed by: DERMATOLOGY

## 2022-10-18 PROCEDURE — 87340 HEPATITIS B SURFACE AG IA: CPT

## 2022-10-18 PROCEDURE — 86803 HEPATITIS C AB TEST: CPT

## 2022-10-18 PROCEDURE — 80053 COMPREHEN METABOLIC PANEL: CPT | Performed by: DERMATOLOGY

## 2022-10-18 PROCEDURE — 36415 COLL VENOUS BLD VENIPUNCTURE: CPT | Performed by: DERMATOLOGY

## 2022-10-18 PROCEDURE — 86706 HEP B SURFACE ANTIBODY: CPT

## 2022-10-18 RX ORDER — LEVOTHYROXINE SODIUM 0.12 MG/1
125 TABLET ORAL DAILY
COMMUNITY

## 2022-10-18 RX ORDER — OMEGA-3S/DHA/EPA/FISH OIL/D3 300MG-1000
400 CAPSULE ORAL DAILY
COMMUNITY

## 2022-10-18 RX ORDER — FOLIC ACID 1 MG/1
TABLET ORAL
Qty: 30 TABLET | Refills: 11 | Status: SHIPPED | OUTPATIENT
Start: 2022-10-18 | End: 2022-11-18

## 2022-10-18 NOTE — PROGRESS NOTES
[facility] Follow Up Note    Patient Name: Antoni Tom  Encounter Date: 10/18/2022    Today's Chief Concerns:  • Concern #1:  Follow up dermatomyositis    Current Medications:    Current Outpatient Medications:   •  betamethasone, augmented, (DIPROLENE) 0 05 % ointment, Apply topically 2 (two) times a day To fingers as needed, Disp: 50 g, Rfl: 0  •  clobetasol (TEMOVATE) 0 05 % external solution, Apply topically 2 (two) times a day To scalp as needed, Disp: 50 mL, Rfl: 0  •  hydrocortisone 2 5 % lotion, Apply 1 application topically 2 (two) times a day As needed, Disp: , Rfl:   •  levothyroxine (Synthroid) 125 mcg tablet, Take 125 mcg by mouth daily, Disp: , Rfl:   •  raloxifene (EVISTA) 60 mg tablet, Take 60 mg by mouth daily, Disp: , Rfl:   •  triamcinolone (KENALOG) 0 1 % cream, , Disp: , Rfl:   •  triamcinolone (KENALOG) 0 1 % ointment, Apply 1 application topically 2 (two) times a day To rash on face and body, Disp: 454 g, Rfl: 1  •  hydroxychloroquine (PLAQUENIL) 200 mg tablet, Take 1 tablet (200 mg total) by mouth 2 (two) times a day with meals (Patient not taking: No sig reported), Disp: 180 tablet, Rfl: 0  •  levothyroxine 125 mcg tablet, Take 125 mcg by mouth daily, Disp: , Rfl:     CONSTITUTIONAL:   Vitals:    10/18/22 0830   Temp: 97 7 °F (36 5 °C)   Weight: 59 kg (130 lb)   Height: 4' 10" (1 473 m)       Specific Alerts:    Have you been seen by a [facility] Dermatologist in the last 3 years? YES    Are you pregnant or planning to become pregnant? No    Are you currently or planning to be nursing or breast feeding? No    Allergies   Allergen Reactions   • Hydroxychloroquine Rash       May we call your Preferred Phone number to discuss your specific medical information? YES    May we leave a detailed message that includes your specific medical information? YES    Have you traveled outside of the [facility] in the past 3 months?  No    Do you currently have a pacemaker or defibrillator? No    Do you have any artificial heart valves, joints, plates, screws, rods, stents, pins, etc? No   - If Yes, were any placed within the last 2 years? Do you require any medications prior to a surgical procedure? No   - If Yes, for which procedure? - If Yes, what medications to you require? Are you taking any medications that cause you to bleed more easily ("blood thinners") No    Have you ever experienced a rapid heartbeat with epinephrine? No      Review of Systems:  Have you recently had or currently have any of the following?     · Fever or chills: No  · Night Sweats: No  · Headaches: No  · Weight Gain: No  · Weight Loss: No  · Blurry Vision: No  · Nausea: No  · Vomiting: No  · Diarrhea: No  · Blood in Stool: No  · Abdominal Pain: No  · Itchy Skin: No  · Painful Joints: YES  · Swollen Joints: YES  · Muscle Pain: YES  · Irregular Mole: No  · Sun Burn: No  · Dry Skin: YES  · Skin Color Changes: YES  · Scar or Keloid: No  · Cold Sores/Fever Blisters: No  · Bacterial Infections/MRSA: No  · Anxiety: No  · Depression: No  · Suicidal or Homicidal Thoughts: No      PSYCH: Normal mood and affect  EYES: Normal conjunctiva  ENT: Normal lips and oral mucosa  CARDIOVASCULAR: No edema  RESPIRATORY: Normal respirations  HEME/LYMPH/IMMUNO:  No regional lymphadenopathy except as noted below in ASSESSMENT AND PLAN BY DIAGNOSIS    FULL ORGAN SYSTEM SKIN EXAM (SKIN)   Hair, Scalp, Ears, Face Normal except as noted below in Assessment   Neck, Cervical Chain Nodes Normal except as noted below in Assessment   Right Arm/Hand/Fingers Normal except as noted below in Assessment   Left Arm/Hand/Fingers Normal except as noted below in Assessment   Chest/Axillae Viewed areas Normal except as noted below in Assessment   Abdomen, Umbilicus Normal except as noted below in Assessment   Back/Spine Normal except as noted below in Assessment   Groin Viewed areas Normal except as noted below in Assessment   Right Leg, Foot, Toes Normal except as noted below in Assessment   Left Leg, Foot, Toes Normal except as noted below in Assessment       DERMATOMYOSITIS    Physical Exam:  • Anatomic Location Affected:  Face, arms, legs, trunk, scalp  • Morphological Description:  Diffuse erythematous patches, slight scaling; diffuse xerosis   • Pertinent Negatives: no muscle pain    Additional History of Present Condition:  Patient had rash reaction to hydroxychloroquine and has stopped  Patient is applying betamethasone 0 05% ointment with little relief  Patient also has betamethasone 0 05% ointment, hydrocortisone 2 5% lotion, triamcinolone 0 1% ointment, triamcinolone 0 1% cream   Patient reports extremely dry skin but no itchy  Denies muscle pain  Mammogram scheduled for November  Patient feels if she gets a cut on her skin that it takes a long time to heal      CT A/P was negative; ovarian ultrasound was remarkable for a scar- thinks this may be from having a fibroidectomy in her fallopian tubes/ovaries years ago but still has further evaluation appt scheduled with Ob-GYN  Assessment and Plan:  Based on a thorough discussion of this condition and the management approach to it (including a comprehensive discussion of the known risks, side effects and potential benefits of treatment), the patient (family) agrees to implement the following specific plan:  Use moisturizer like plain Vaseline,  Eucerin,Cerave, Vanicream or Aveeno Cream 3 times a day for the dry skin     •         Discussed with patient that if she were to start having muscle pain she is to notify us  Advised that it is possible you may not develop muscle pain/ aches  Discussed lab results with patient  Informing patient that it is important to keep up with your colonoscopies and mammograms and other cancer screenings  Also that it is important to follow up with PCP to have blood work screenings to make sure blood work in within normal limits       Discussed with patient starting Methotrexate  Along with Methotrexate you will need to take Folic Acid  We will need to check blood work frequently, every    two weeks, until we have you on a stable dose  Methotrexate can cause your immune system to be suppressed which can put you at risk for more frequent    infections  It can take 2 months before notice an improvement with Methotrexate  Also discussed with patient that if Methotrexate is not effective we may    need to discuss IV treatment called IVIG  We recommend that you do not apply any of the topical medications to your skin unless your skin is itchy as long term use can thin your skin  It is extremely    important to apply a moisturizer at least 3 times a day  Methotrexate 2 5 mg, take 6 tablets (15 mg total) once a week  This prescription was sent to your pharmacy  Folic Acid 1 mg tablet take one tablet daily  We sent this in as a prescription but it may not be covered so you would buy it over the counter  · We will call you with your blood results; we will repeat them in 2 weeks  · Follow-up in 2-3 months      What causes dermatomyositis? Dermatomyositis is a rare acquired muscle disease that is accompanied by a rash  It is one of a group of muscle diseases called inflammatory myopathies  Dermatomyositis may affect people of any race, age or sex, although it is twice as common in women than in men  The onset of the disease is most common in those aged 50-70 years  Dermatomyositis is considered one of the connective tissue diseases, like systemic sclerosis and lupus erythematosus  Dermatomyositis is thought to be caused by small vessel damage which in turn affects skin and muscle  Factors that may contribute to its development are listed below    • Genetic predisposition  • Underlying cancer (more likely in older people)  • Autoimmune (immune reaction against self)  • Infectious or toxic agents acting as triggers  • Certain drugs, which include hydroxyurea, penicillamine, statins, quinidine, and phenylbutazone     What are the symptoms of dermatomyositis? The two main groups of symptoms affect the skin and muscles  In many patients, the first sign of dermatomyositis is the presence of a symptomless, itchy or burning rash  The rash often, but not always, develops before the muscle weakness  • Reddish or bluish-purple patches mostly affect sun-exposed areas  • A violaceous (purple) rash may also affect cheeks, nose, shoulders, upper chest, elbows, and outer thighs  • Purple eyelids, which are described as heliotrope, as they resemble the heliotrope flower, Heliotropium peruvianum, which has small purple petals  • A scaly scalp and thinned out hair may occur  • Less commonly, there is poikiloderma, in which the skin is atrophic (pale, thin skin), red(dilated blood vessels) and brown (post-inflammatory pigmentation)  • Purple papules or plaques are found on bony prominences, especially the knuckles (Gottron papules)  • Ragged cuticles and prominent blood vessels on nail folds are best seen by capillaroscopy or dermoscopy  In severe cases, calcinosis can occur  • It presents as hard yellow or white lumps under the skin  • These usually appear on fingers or over joints  • Sometimes these nodules may poke through the skin and ulcerate  • The ulcers may become infected  Muscle weakness may arise at the same time as the dermatomyositis rash, or it may occur weeks, months or years later  Proximal muscles are affected, that is, those closest to the trunk (upper arms, thighs)  The first indication of myositis is when the following everyday movements become difficult  • Climbing stairs or walking  • Rising from a sitting or crouching position  • Lifting objects  • Raising arms above the shoulders, e g  combing hair  • Difficulty swallowing (dysphagia)  Occasionally the affected muscles ache and become tender to touch      How do we diagnose dermatomyositis? The diagnosis of dermatomyositis is usually confirmed by the following tests  • Blood test to detect raised circulating muscle enzymes: creatine kinase (CK) and sometimes aldolase, aspartate aminotransferase (AST) and lactic dehydrogenase (LDH)  • Blood test to detect autoantibodies: non-specific antinuclear antibody (XIMENA) is found in most patients, specific Anti-Mi-2 is found in one quarter and Anti-Barbie-1 in a few, usually those who have lung disease, and are diagnostic of antisynthetase syndrome  • Skin biopsy of the rash: the microscopic appearance of an interface dermatitis is similar to systemic (acute) lupus erythematosus  • Biopsy of an affected muscle  • Electromyography (EMG) testing  • Magnetic resonance imaging (MRI) scan of muscles  In those over 60, full body examination and testing are recommended to look for underlying cancer  Cancer screenings are recommended for up to five years after the initial onset of dermatomyositis in these patients  How do we treat dermatomyositis? The primary aim of treatment is to control the skin disease and muscle disease  An oral corticosteroid such as prednisone in moderate to high dose is the mainstay of medical therapy and is given to slow down the rate of disease progression  Immunosuppressive or cytotoxic drugs may also be used including methotrexate, azathioprine, cyclophosphamide, ciclosporin, mycophenolate, high dose intravenous immunoglobulin and experimentally, biologics such as rituximab   Other important measures in the management of dermatomyositis include:  • Diltiazem, a calcium channel blocker usually prescribed for high blood pressure, may reduce calcinosis  • Colchicine has also been reported to reduce calcinosis  • Hydroxychloroquine may reduce the photosensitive rash  • Avoid excessive sun exposure and use sun protection measures, including sunscreens, to minimize the harmful effects of the sun on already damaged and photosensitive skin  • Bedrest for those with severe inflammation of muscles  • Physical therapy and activity to keep the muscles and joints moving  • Avoid eating food before bedtime and raise the bed head for those with difficulty swallowing    Most patients will require treatment throughout their lifetime, but dermatomyositis completely resolves in about one-in-five patients  Patients who have a disease affecting their heart or lungs, or who also have underlying cancer often require more help from a multidisciplinary team        Scribe Attestation    I,:  Anthony Vela am acting as a scribe while in the presence of the attending physician :       I,:  Hector Reid MD personally performed the services described in this documentation    as scribed in my presence :         The patient was seen and discussed with Dr Hector Reid       RTC: 2-3 months for dermatomyositis follow-up    Ron Abdi  Dermatology PGY-4 Resident Physician

## 2022-10-18 NOTE — PATIENT INSTRUCTIONS
DERMATOMYOSITIS    Assessment and Plan:  Based on a thorough discussion of this condition and the management approach to it (including a comprehensive discussion of the known risks, side effects and potential benefits of treatment), the patient (family) agrees to implement the following specific plan:  Use moisturizer like plain Vaseline,  Eucerin,Cerave, Vanicream or Aveeno Cream 3 times a day for the dry skin             Discussed with patient that if she were to start having muscle pain she is to notify us  Advised that it is possible you may not develop muscle pain/ aches  Discussed lab results with patient  Informing patient that it is important to keep up with your colonoscopies and mammograms and other cancer screenings  Also that it is important to follow up with PCP to have blood work screenings to make sure blood work in within normal limits  Discussed with patient starting Methotrexate  Along with Methotrexate you will need to take Folic Acid  We will need to check blood work frequently, every    two weeks, until we have you on a stable dose  Methotrexate can cause your immune system to be suppressed which can put you at risk for more frequent    infections  It can take 2 months before notice an improvement with Methotrexate  Also discussed with patient that if Methotrexate is not effective we may    need to discuss IV treatment called IVIG  We recommend that you do not apply any of the topical medications to your skin unless your skin is itchy as long term use can thin your skin  It is extremely    important to apply a moisturizer at least 3 times a day  Methotrexate 2 5 mg, take 6 tablets (15 mg total) once a week  This prescription was sent to your pharmacy  Folic Acid 1 mg tablet take one tablet daily  We sent this in as a prescription but it may not be covered so you would buy it over the counter  We will call you with your blood results; we will repeat them in 2 weeks  Follow-up in 2-3 months    What causes dermatomyositis? Dermatomyositis is a rare acquired muscle disease that is accompanied by a rash  It is one of a group of muscle diseases called inflammatory myopathies  Dermatomyositis may affect people of any race, age or sex, although it is twice as common in women than in men  The onset of the disease is most common in those aged 50-70 years  Dermatomyositis is considered one of the connective tissue diseases, like systemic sclerosis and lupus erythematosus  Dermatomyositis is thought to be caused by small vessel damage which in turn affects skin and muscle  Factors that may contribute to its development are listed below  Genetic predisposition  Underlying cancer (more likely in older people)  Autoimmune (immune reaction against self)  Infectious or toxic agents acting as triggers  Certain drugs, which include hydroxyurea, penicillamine, statins, quinidine, and phenylbutazone     What are the symptoms of dermatomyositis? The two main groups of symptoms affect the skin and muscles  In many patients, the first sign of dermatomyositis is the presence of a symptomless, itchy or burning rash  The rash often, but not always, develops before the muscle weakness  Reddish or bluish-purple patches mostly affect sun-exposed areas  A violaceous (purple) rash may also affect cheeks, nose, shoulders, upper chest, elbows, and outer thighs  Purple eyelids, which are described as heliotrope, as they resemble the heliotrope flower, Heliotropium peruvianum, which has small purple petals  A scaly scalp and thinned out hair may occur  Less commonly, there is poikiloderma, in which the skin is atrophic (pale, thin skin), red(dilated blood vessels) and brown (post-inflammatory pigmentation)  Purple papules or plaques are found on bony prominences, especially the knuckles (Gottron papules)    Ragged cuticles and prominent blood vessels on nail folds are best seen by capillaroscopy or dermoscopy  In severe cases, calcinosis can occur  It presents as hard yellow or white lumps under the skin  These usually appear on fingers or over joints  Sometimes these nodules may poke through the skin and ulcerate  The ulcers may become infected  Muscle weakness may arise at the same time as the dermatomyositis rash, or it may occur weeks, months or years later  Proximal muscles are affected, that is, those closest to the trunk (upper arms, thighs)  The first indication of myositis is when the following everyday movements become difficult  Climbing stairs or walking  Rising from a sitting or crouching position  Lifting objects  Raising arms above the shoulders, e g  combing hair  Difficulty swallowing (dysphagia)  Occasionally the affected muscles ache and become tender to touch  How do we diagnose dermatomyositis? The diagnosis of dermatomyositis is usually confirmed by the following tests  Blood test to detect raised circulating muscle enzymes: creatine kinase (CK) and sometimes aldolase, aspartate aminotransferase (AST) and lactic dehydrogenase (LDH)  Blood test to detect autoantibodies: non-specific antinuclear antibody (XIMENA) is found in most patients, specific Anti-Mi-2 is found in one quarter and Anti-Barbie-1 in a few, usually those who have lung disease, and are diagnostic of antisynthetase syndrome  Skin biopsy of the rash: the microscopic appearance of an interface dermatitis is similar to systemic (acute) lupus erythematosus  Biopsy of an affected muscle  Electromyography (EMG) testing  Magnetic resonance imaging (MRI) scan of muscles  In those over 60, full body examination and testing are recommended to look for underlying cancer  Cancer screenings are recommended for up to five years after the initial onset of dermatomyositis in these patients  How do we treat dermatomyositis? The primary aim of treatment is to control the skin disease and muscle disease   An oral corticosteroid such as prednisone in moderate to high dose is the mainstay of medical therapy and is given to slow down the rate of disease progression  Immunosuppressive or cytotoxic drugs may also be used including methotrexate, azathioprine, cyclophosphamide, ciclosporin, mycophenolate, high dose intravenous immunoglobulin and experimentally, biologics such as rituximab  Other important measures in the management of dermatomyositis include:  Diltiazem, a calcium channel blocker usually prescribed for high blood pressure, may reduce calcinosis  Colchicine has also been reported to reduce calcinosis  Hydroxychloroquine may reduce the photosensitive rash  Avoid excessive sun exposure and use sun protection measures, including sunscreens, to minimize the harmful effects of the sun on already damaged and photosensitive skin  Bedrest for those with severe inflammation of muscles  Physical therapy and activity to keep the muscles and joints moving  Avoid eating food before bedtime and raise the bed head for those with difficulty swallowing    Most patients will require treatment throughout their lifetime, but dermatomyositis completely resolves in about one-in-five patients   Patients who have a disease affecting their heart or lungs, or who also have underlying cancer often require more help from a multidisciplinary team

## 2022-11-02 DIAGNOSIS — M33.90 DERMATOMYOSITIS (HCC): ICD-10-CM

## 2022-11-02 RX ORDER — CLOBETASOL PROPIONATE 0.46 MG/ML
SOLUTION TOPICAL 2 TIMES DAILY
Qty: 50 ML | Refills: 0 | Status: SHIPPED | OUTPATIENT
Start: 2022-11-02 | End: 2022-11-08 | Stop reason: SDUPTHER

## 2022-11-08 DIAGNOSIS — M33.90 DERMATOMYOSITIS (HCC): ICD-10-CM

## 2022-11-08 RX ORDER — CLOBETASOL PROPIONATE 0.46 MG/ML
SOLUTION TOPICAL 2 TIMES DAILY
Qty: 50 ML | Refills: 0 | Status: SHIPPED | OUTPATIENT
Start: 2022-11-08

## 2022-11-18 DIAGNOSIS — M33.90 DERMATOMYOSITIS (HCC): ICD-10-CM

## 2022-11-18 NOTE — PROGRESS NOTES
Overall rash improving  Having increased hair loss  Increase methotrexate to 20 mg weekly  Follow up lab in 2 weeks

## 2022-11-22 ENCOUNTER — TELEPHONE (OUTPATIENT)
Dept: DERMATOLOGY | Facility: CLINIC | Age: 67
End: 2022-11-22

## 2022-11-29 NOTE — TELEPHONE ENCOUNTER
Prior authorization was submitted through covermymeds for Clobetasol Propionate 0 05% solution   Key#[id number]

## 2022-12-14 DIAGNOSIS — M33.90 DERMATOMYOSITIS (HCC): ICD-10-CM

## 2022-12-14 RX ORDER — METHOTREXATE 2.5 MG/1
22.5 TABLET ORAL WEEKLY
Qty: 54 TABLET | Refills: 0 | Status: SHIPPED | OUTPATIENT
Start: 2022-12-14 | End: 2022-12-20 | Stop reason: SDUPTHER

## 2022-12-20 ENCOUNTER — DOCUMENTATION (OUTPATIENT)
Dept: DERMATOLOGY | Facility: CLINIC | Age: 67
End: 2022-12-20

## 2022-12-20 DIAGNOSIS — M33.90 DERMATOMYOSITIS (HCC): Primary | ICD-10-CM

## 2022-12-20 DIAGNOSIS — M33.90 DERMATOMYOSITIS (HCC): ICD-10-CM

## 2022-12-20 RX ORDER — METHOTREXATE 2.5 MG/1
22.5 TABLET ORAL WEEKLY
Qty: 36 TABLET | Refills: 2 | Status: SHIPPED | OUTPATIENT
Start: 2022-12-20

## 2022-12-20 RX ORDER — MINOXIDIL 2.5 MG/1
1.25 TABLET ORAL DAILY
Qty: 30 TABLET | Refills: 3 | Status: SHIPPED | OUTPATIENT
Start: 2022-12-20

## 2023-02-08 LAB — HBA1C MFR BLD HPLC: 6 %

## 2023-02-15 ENCOUNTER — DOCUMENTATION (OUTPATIENT)
Dept: DERMATOLOGY | Facility: CLINIC | Age: 68
End: 2023-02-15

## 2023-02-15 DIAGNOSIS — M33.90 DERMATOMYOSITIS (HCC): Primary | ICD-10-CM

## 2023-02-15 RX ORDER — CLOBETASOL PROPIONATE 0.46 MG/ML
SOLUTION TOPICAL 2 TIMES DAILY
Qty: 50 ML | Refills: 5 | Status: SHIPPED | OUTPATIENT
Start: 2023-02-15 | End: 2023-02-20 | Stop reason: SDUPTHER

## 2023-02-20 RX ORDER — CLOBETASOL PROPIONATE 0.46 MG/ML
SOLUTION TOPICAL 2 TIMES DAILY
Qty: 50 ML | Refills: 5 | Status: SHIPPED | OUTPATIENT
Start: 2023-02-20 | End: 2023-02-21 | Stop reason: SDUPTHER

## 2023-02-21 DIAGNOSIS — M33.90 DERMATOMYOSITIS (HCC): ICD-10-CM

## 2023-02-21 RX ORDER — CLOBETASOL PROPIONATE 0.46 MG/ML
SOLUTION TOPICAL 2 TIMES DAILY
Qty: 50 ML | Refills: 5 | Status: SHIPPED | OUTPATIENT
Start: 2023-02-21

## 2023-02-27 ENCOUNTER — TELEPHONE (OUTPATIENT)
Dept: DERMATOLOGY | Facility: CLINIC | Age: 68
End: 2023-02-27

## 2023-03-01 NOTE — TELEPHONE ENCOUNTER
Prior [place] was [place] through [facility] [facility] [id number]

## 2023-03-09 DIAGNOSIS — M33.90 DERMATOMYOSITIS (HCC): ICD-10-CM

## 2023-03-09 RX ORDER — METHOTREXATE 2.5 MG/1
22.5 TABLET ORAL WEEKLY
Qty: 36 TABLET | Refills: 2 | Status: SHIPPED | OUTPATIENT
Start: 2023-03-09 | End: 2023-05-31

## 2023-05-18 DIAGNOSIS — M33.90 DERMATOMYOSITIS (HCC): Primary | ICD-10-CM

## 2023-05-24 LAB
ALBUMIN SERPL-MCNC: 4.2 G/DL (ref 3.6–5.1)
ALBUMIN/GLOB SERPL: 1.5 (CALC) (ref 1–2.5)
ALP SERPL-CCNC: 60 U/L (ref 37–153)
ALT SERPL-CCNC: 7 U/L (ref 6–29)
AST SERPL-CCNC: 15 U/L (ref 10–35)
BASOPHILS # BLD AUTO: 38 CELLS/UL (ref 0–200)
BASOPHILS NFR BLD AUTO: 1 %
BILIRUB SERPL-MCNC: 0.4 MG/DL (ref 0.2–1.2)
BUN SERPL-MCNC: 14 MG/DL (ref 7–25)
BUN/CREAT SERPL: ABNORMAL (CALC) (ref 6–22)
CALCIUM SERPL-MCNC: 9.2 MG/DL (ref 8.6–10.4)
CHLORIDE SERPL-SCNC: 107 MMOL/L (ref 98–110)
CO2 SERPL-SCNC: 29 MMOL/L (ref 20–32)
CREAT SERPL-MCNC: 0.51 MG/DL (ref 0.5–1.05)
EOSINOPHIL # BLD AUTO: 99 CELLS/UL (ref 15–500)
EOSINOPHIL NFR BLD AUTO: 2.6 %
ERYTHROCYTE [DISTWIDTH] IN BLOOD BY AUTOMATED COUNT: 13.6 % (ref 11–15)
GFR/BSA.PRED SERPLBLD CYS-BASED-ARV: 103 ML/MIN/1.73M2
GLOBULIN SER CALC-MCNC: 2.8 G/DL (CALC) (ref 1.9–3.7)
GLUCOSE SERPL-MCNC: 102 MG/DL (ref 65–99)
HCT VFR BLD AUTO: 38.5 % (ref 35–45)
HGB BLD-MCNC: 13.4 G/DL (ref 11.7–15.5)
LYMPHOCYTES # BLD AUTO: 1087 CELLS/UL (ref 850–3900)
LYMPHOCYTES NFR BLD AUTO: 28.6 %
MCH RBC QN AUTO: 32.2 PG (ref 27–33)
MCHC RBC AUTO-ENTMCNC: 34.8 G/DL (ref 32–36)
MCV RBC AUTO: 92.5 FL (ref 80–100)
MONOCYTES # BLD AUTO: 338 CELLS/UL (ref 200–950)
MONOCYTES NFR BLD AUTO: 8.9 %
NEUTROPHILS # BLD AUTO: 2238 CELLS/UL (ref 1500–7800)
NEUTROPHILS NFR BLD AUTO: 58.9 %
PLATELET # BLD AUTO: 271 THOUSAND/UL (ref 140–400)
PMV BLD REES-ECKER: 11.2 FL (ref 7.5–12.5)
POTASSIUM SERPL-SCNC: 4.1 MMOL/L (ref 3.5–5.3)
PROT SERPL-MCNC: 7 G/DL (ref 6.1–8.1)
RBC # BLD AUTO: 4.16 MILLION/UL (ref 3.8–5.1)
SODIUM SERPL-SCNC: 142 MMOL/L (ref 135–146)
WBC # BLD AUTO: 3.8 THOUSAND/UL (ref 3.8–10.8)

## 2023-05-26 DIAGNOSIS — M33.90 DERMATOMYOSITIS (HCC): ICD-10-CM

## 2023-07-06 LAB
ALBUMIN SERPL-MCNC: 4.1 G/DL (ref 3.6–5.1)
ALBUMIN/GLOB SERPL: 1.6 (CALC) (ref 1–2.5)
ALP SERPL-CCNC: 92 U/L (ref 37–153)
ALT SERPL-CCNC: 11 U/L (ref 6–29)
AST SERPL-CCNC: 16 U/L (ref 10–35)
BASOPHILS # BLD AUTO: 29 CELLS/UL (ref 0–200)
BASOPHILS NFR BLD AUTO: 0.7 %
BILIRUB SERPL-MCNC: 0.4 MG/DL (ref 0.2–1.2)
BUN SERPL-MCNC: 14 MG/DL (ref 7–25)
BUN/CREAT SERPL: ABNORMAL (CALC) (ref 6–22)
CALCIUM SERPL-MCNC: 9.1 MG/DL (ref 8.6–10.4)
CHLORIDE SERPL-SCNC: 105 MMOL/L (ref 98–110)
CO2 SERPL-SCNC: 30 MMOL/L (ref 20–32)
CREAT SERPL-MCNC: 0.54 MG/DL (ref 0.5–1.05)
EOSINOPHIL # BLD AUTO: 201 CELLS/UL (ref 15–500)
EOSINOPHIL NFR BLD AUTO: 4.9 %
ERYTHROCYTE [DISTWIDTH] IN BLOOD BY AUTOMATED COUNT: 13.3 % (ref 11–15)
GFR/BSA.PRED SERPLBLD CYS-BASED-ARV: 101 ML/MIN/1.73M2
GLOBULIN SER CALC-MCNC: 2.6 G/DL (CALC) (ref 1.9–3.7)
GLUCOSE SERPL-MCNC: 111 MG/DL (ref 65–99)
HCT VFR BLD AUTO: 38.9 % (ref 35–45)
HGB BLD-MCNC: 13.3 G/DL (ref 11.7–15.5)
LYMPHOCYTES # BLD AUTO: 980 CELLS/UL (ref 850–3900)
LYMPHOCYTES NFR BLD AUTO: 23.9 %
MCH RBC QN AUTO: 31.3 PG (ref 27–33)
MCHC RBC AUTO-ENTMCNC: 34.2 G/DL (ref 32–36)
MCV RBC AUTO: 91.5 FL (ref 80–100)
MONOCYTES # BLD AUTO: 541 CELLS/UL (ref 200–950)
MONOCYTES NFR BLD AUTO: 13.2 %
NEUTROPHILS # BLD AUTO: 2349 CELLS/UL (ref 1500–7800)
NEUTROPHILS NFR BLD AUTO: 57.3 %
PLATELET # BLD AUTO: 294 THOUSAND/UL (ref 140–400)
PMV BLD REES-ECKER: 10.8 FL (ref 7.5–12.5)
POTASSIUM SERPL-SCNC: 4 MMOL/L (ref 3.5–5.3)
PROT SERPL-MCNC: 6.7 G/DL (ref 6.1–8.1)
RBC # BLD AUTO: 4.25 MILLION/UL (ref 3.8–5.1)
SODIUM SERPL-SCNC: 140 MMOL/L (ref 135–146)
WBC # BLD AUTO: 4.1 THOUSAND/UL (ref 3.8–10.8)

## 2023-08-17 LAB
ALBUMIN SERPL-MCNC: 4.4 G/DL (ref 3.6–5.1)
ALBUMIN/GLOB SERPL: 1.7 (CALC) (ref 1–2.5)
ALP SERPL-CCNC: 89 U/L (ref 37–153)
ALT SERPL-CCNC: 8 U/L (ref 6–29)
AST SERPL-CCNC: 14 U/L (ref 10–35)
BASOPHILS # BLD AUTO: 41 CELLS/UL (ref 0–200)
BASOPHILS NFR BLD AUTO: 0.9 %
BILIRUB SERPL-MCNC: 0.5 MG/DL (ref 0.2–1.2)
BUN SERPL-MCNC: 21 MG/DL (ref 7–25)
BUN/CREAT SERPL: NORMAL (CALC) (ref 6–22)
CALCIUM SERPL-MCNC: 9.7 MG/DL (ref 8.6–10.4)
CHLORIDE SERPL-SCNC: 105 MMOL/L (ref 98–110)
CO2 SERPL-SCNC: 29 MMOL/L (ref 20–32)
CREAT SERPL-MCNC: 0.55 MG/DL (ref 0.5–1.05)
EOSINOPHIL # BLD AUTO: 92 CELLS/UL (ref 15–500)
EOSINOPHIL NFR BLD AUTO: 2 %
ERYTHROCYTE [DISTWIDTH] IN BLOOD BY AUTOMATED COUNT: 13.2 % (ref 11–15)
GFR/BSA.PRED SERPLBLD CYS-BASED-ARV: 101 ML/MIN/1.73M2
GLOBULIN SER CALC-MCNC: 2.6 G/DL (CALC) (ref 1.9–3.7)
GLUCOSE SERPL-MCNC: 98 MG/DL (ref 65–99)
HCT VFR BLD AUTO: 40.8 % (ref 35–45)
HGB BLD-MCNC: 13.9 G/DL (ref 11.7–15.5)
LYMPHOCYTES # BLD AUTO: 1104 CELLS/UL (ref 850–3900)
LYMPHOCYTES NFR BLD AUTO: 24 %
MCH RBC QN AUTO: 31.4 PG (ref 27–33)
MCHC RBC AUTO-ENTMCNC: 34.1 G/DL (ref 32–36)
MCV RBC AUTO: 92.1 FL (ref 80–100)
MONOCYTES # BLD AUTO: 331 CELLS/UL (ref 200–950)
MONOCYTES NFR BLD AUTO: 7.2 %
NEUTROPHILS # BLD AUTO: 3031 CELLS/UL (ref 1500–7800)
NEUTROPHILS NFR BLD AUTO: 65.9 %
PLATELET # BLD AUTO: 311 THOUSAND/UL (ref 140–400)
PMV BLD REES-ECKER: 10.8 FL (ref 7.5–12.5)
POTASSIUM SERPL-SCNC: 4.3 MMOL/L (ref 3.5–5.3)
PROT SERPL-MCNC: 7 G/DL (ref 6.1–8.1)
RBC # BLD AUTO: 4.43 MILLION/UL (ref 3.8–5.1)
SODIUM SERPL-SCNC: 141 MMOL/L (ref 135–146)
WBC # BLD AUTO: 4.6 THOUSAND/UL (ref 3.8–10.8)

## 2023-08-29 DIAGNOSIS — M33.90 DERMATOMYOSITIS (HCC): ICD-10-CM

## 2023-08-29 RX ORDER — MINOXIDIL 2.5 MG/1
1.25 TABLET ORAL DAILY
Qty: 90 TABLET | Refills: 1 | Status: SHIPPED | OUTPATIENT
Start: 2023-08-29

## 2023-09-04 DIAGNOSIS — M33.90 DERMATOMYOSITIS (HCC): ICD-10-CM

## 2023-09-05 RX ORDER — FOLIC ACID 1 MG/1
TABLET ORAL
Qty: 90 TABLET | Refills: 3 | Status: SHIPPED | OUTPATIENT
Start: 2023-09-05

## 2023-09-18 NOTE — TELEPHONE ENCOUNTER
Please overbook for [facility] [place], [place], 10/18, 8:30 am with me, ovs, dermatomyositis follow up  English

## 2023-10-17 LAB
ALBUMIN SERPL-MCNC: 4.2 G/DL (ref 3.6–5.1)
ALBUMIN/GLOB SERPL: 1.6 (CALC) (ref 1–2.5)
ALP SERPL-CCNC: 96 U/L (ref 37–153)
ALT SERPL-CCNC: 10 U/L (ref 6–29)
AST SERPL-CCNC: 17 U/L (ref 10–35)
BASOPHILS # BLD AUTO: 53 CELLS/UL (ref 0–200)
BASOPHILS NFR BLD AUTO: 0.8 %
BILIRUB SERPL-MCNC: 0.4 MG/DL (ref 0.2–1.2)
BUN SERPL-MCNC: 11 MG/DL (ref 7–25)
BUN/CREAT SERPL: NORMAL (CALC) (ref 6–22)
CALCIUM SERPL-MCNC: 9.7 MG/DL (ref 8.6–10.4)
CHLORIDE SERPL-SCNC: 104 MMOL/L (ref 98–110)
CO2 SERPL-SCNC: 30 MMOL/L (ref 20–32)
CREAT SERPL-MCNC: 0.59 MG/DL (ref 0.5–1.05)
EOSINOPHIL # BLD AUTO: 99 CELLS/UL (ref 15–500)
EOSINOPHIL NFR BLD AUTO: 1.5 %
ERYTHROCYTE [DISTWIDTH] IN BLOOD BY AUTOMATED COUNT: 13.7 % (ref 11–15)
GFR/BSA.PRED SERPLBLD CYS-BASED-ARV: 99 ML/MIN/1.73M2
GLOBULIN SER CALC-MCNC: 2.7 G/DL (CALC) (ref 1.9–3.7)
GLUCOSE SERPL-MCNC: 97 MG/DL (ref 65–99)
HCT VFR BLD AUTO: 41.7 % (ref 35–45)
HGB BLD-MCNC: 13.9 G/DL (ref 11.7–15.5)
LYMPHOCYTES # BLD AUTO: 1168 CELLS/UL (ref 850–3900)
LYMPHOCYTES NFR BLD AUTO: 17.7 %
MCH RBC QN AUTO: 30.8 PG (ref 27–33)
MCHC RBC AUTO-ENTMCNC: 33.3 G/DL (ref 32–36)
MCV RBC AUTO: 92.3 FL (ref 80–100)
MONOCYTES # BLD AUTO: 462 CELLS/UL (ref 200–950)
MONOCYTES NFR BLD AUTO: 7 %
NEUTROPHILS # BLD AUTO: 4818 CELLS/UL (ref 1500–7800)
NEUTROPHILS NFR BLD AUTO: 73 %
PLATELET # BLD AUTO: 352 THOUSAND/UL (ref 140–400)
PMV BLD REES-ECKER: 10.2 FL (ref 7.5–12.5)
POTASSIUM SERPL-SCNC: 4.2 MMOL/L (ref 3.5–5.3)
PROT SERPL-MCNC: 6.9 G/DL (ref 6.1–8.1)
RBC # BLD AUTO: 4.52 MILLION/UL (ref 3.8–5.1)
SODIUM SERPL-SCNC: 141 MMOL/L (ref 135–146)
WBC # BLD AUTO: 6.6 THOUSAND/UL (ref 3.8–10.8)

## 2023-10-25 DIAGNOSIS — M33.90 DERMATOMYOSITIS (HCC): ICD-10-CM

## 2023-12-05 DIAGNOSIS — M33.90 DERMATOMYOSITIS (HCC): Primary | ICD-10-CM

## 2023-12-05 DIAGNOSIS — Z79.899 HIGH RISK MEDICATION USE: ICD-10-CM

## 2023-12-13 LAB
ALBUMIN SERPL-MCNC: 4.2 G/DL (ref 3.6–5.1)
ALBUMIN/GLOB SERPL: 1.6 (CALC) (ref 1–2.5)
ALP SERPL-CCNC: 102 U/L (ref 37–153)
ALT SERPL-CCNC: 8 U/L (ref 6–29)
AST SERPL-CCNC: 14 U/L (ref 10–35)
BILIRUB SERPL-MCNC: 0.5 MG/DL (ref 0.2–1.2)
BUN SERPL-MCNC: 19 MG/DL (ref 7–25)
BUN/CREAT SERPL: NORMAL (CALC) (ref 6–22)
CALCIUM SERPL-MCNC: 9.5 MG/DL (ref 8.6–10.4)
CHLORIDE SERPL-SCNC: 104 MMOL/L (ref 98–110)
CO2 SERPL-SCNC: 30 MMOL/L (ref 20–32)
CREAT SERPL-MCNC: 0.51 MG/DL (ref 0.5–1.05)
GFR/BSA.PRED SERPLBLD CYS-BASED-ARV: 102 ML/MIN/1.73M2
GLOBULIN SER CALC-MCNC: 2.7 G/DL (CALC) (ref 1.9–3.7)
GLUCOSE SERPL-MCNC: 96 MG/DL (ref 65–99)
POTASSIUM SERPL-SCNC: 4.2 MMOL/L (ref 3.5–5.3)
PROT SERPL-MCNC: 6.9 G/DL (ref 6.1–8.1)
SODIUM SERPL-SCNC: 140 MMOL/L (ref 135–146)

## 2024-01-09 DIAGNOSIS — M33.90 DERMATOMYOSITIS (HCC): ICD-10-CM

## 2024-01-09 RX ORDER — METHOTREXATE 2.5 MG/1
TABLET ORAL
Refills: 0 | OUTPATIENT
Start: 2024-01-09

## 2024-02-06 LAB
ALBUMIN SERPL-MCNC: 4.2 G/DL (ref 3.6–5.1)
ALBUMIN/GLOB SERPL: 1.8 (CALC) (ref 1–2.5)
ALP SERPL-CCNC: 95 U/L (ref 37–153)
ALT SERPL-CCNC: 7 U/L (ref 6–29)
AST SERPL-CCNC: 14 U/L (ref 10–35)
BILIRUB SERPL-MCNC: 0.5 MG/DL (ref 0.2–1.2)
BUN SERPL-MCNC: 15 MG/DL (ref 7–25)
BUN/CREAT SERPL: 32 (CALC) (ref 6–22)
CALCIUM SERPL-MCNC: 9.1 MG/DL (ref 8.6–10.4)
CHLORIDE SERPL-SCNC: 105 MMOL/L (ref 98–110)
CO2 SERPL-SCNC: 31 MMOL/L (ref 20–32)
CREAT SERPL-MCNC: 0.47 MG/DL (ref 0.5–1.05)
GFR/BSA.PRED SERPLBLD CYS-BASED-ARV: 104 ML/MIN/1.73M2
GLOBULIN SER CALC-MCNC: 2.4 G/DL (CALC) (ref 1.9–3.7)
GLUCOSE SERPL-MCNC: 95 MG/DL (ref 65–99)
POTASSIUM SERPL-SCNC: 4.1 MMOL/L (ref 3.5–5.3)
PROT SERPL-MCNC: 6.6 G/DL (ref 6.1–8.1)
SODIUM SERPL-SCNC: 139 MMOL/L (ref 135–146)

## 2024-03-01 DIAGNOSIS — M33.90 DERMATOMYOSITIS (HCC): ICD-10-CM

## 2024-03-01 RX ORDER — METHOTREXATE 2.5 MG/1
TABLET ORAL
Qty: 90 TABLET | Refills: 0 | Status: SHIPPED | OUTPATIENT
Start: 2024-03-01

## 2024-04-23 LAB
ALBUMIN SERPL-MCNC: 4.4 G/DL (ref 3.6–5.1)
ALBUMIN/GLOB SERPL: 1.6 (CALC) (ref 1–2.5)
ALP SERPL-CCNC: 95 U/L (ref 37–153)
ALT SERPL-CCNC: 8 U/L (ref 6–29)
AST SERPL-CCNC: 16 U/L (ref 10–35)
BILIRUB SERPL-MCNC: 0.4 MG/DL (ref 0.2–1.2)
BUN SERPL-MCNC: 16 MG/DL (ref 7–25)
BUN/CREAT SERPL: ABNORMAL (CALC) (ref 6–22)
CALCIUM SERPL-MCNC: 9.7 MG/DL (ref 8.6–10.4)
CHLORIDE SERPL-SCNC: 103 MMOL/L (ref 98–110)
CO2 SERPL-SCNC: 31 MMOL/L (ref 20–32)
CREAT SERPL-MCNC: 0.59 MG/DL (ref 0.5–1.05)
GFR/BSA.PRED SERPLBLD CYS-BASED-ARV: 99 ML/MIN/1.73M2
GLOBULIN SER CALC-MCNC: 2.7 G/DL (CALC) (ref 1.9–3.7)
GLUCOSE SERPL-MCNC: 106 MG/DL (ref 65–99)
POTASSIUM SERPL-SCNC: 4.5 MMOL/L (ref 3.5–5.3)
PROT SERPL-MCNC: 7.1 G/DL (ref 6.1–8.1)
SODIUM SERPL-SCNC: 142 MMOL/L (ref 135–146)

## 2024-05-16 DIAGNOSIS — M33.13 DERMATOMYOSITIS (HCC): ICD-10-CM

## 2024-05-16 DIAGNOSIS — Z79.899 HIGH RISK MEDICATION USE: Primary | ICD-10-CM

## 2024-05-16 RX ORDER — METHOTREXATE 2.5 MG/1
TABLET ORAL
Refills: 0 | OUTPATIENT
Start: 2024-05-16

## 2024-05-16 RX ORDER — METHOTREXATE 2.5 MG/1
TABLET ORAL
Qty: 90 TABLET | Refills: 0 | Status: SHIPPED | OUTPATIENT
Start: 2024-05-16

## 2024-05-28 ENCOUNTER — TELEPHONE (OUTPATIENT)
Dept: DERMATOLOGY | Facility: CLINIC | Age: 69
End: 2024-05-28

## 2024-05-28 NOTE — TELEPHONE ENCOUNTER
Received [employer] fax from [employer]. Please complete form, date, sign and fax back to [phone]. Form scanned into [employer].

## 2024-07-16 LAB
ALBUMIN SERPL-MCNC: 4.4 G/DL (ref 3.6–5.1)
ALBUMIN/GLOB SERPL: 1.6 (CALC) (ref 1–2.5)
ALP SERPL-CCNC: 98 U/L (ref 37–153)
ALT SERPL-CCNC: 14 U/L (ref 6–29)
AST SERPL-CCNC: 23 U/L (ref 10–35)
BASOPHILS # BLD AUTO: 38 CELLS/UL (ref 0–200)
BASOPHILS NFR BLD AUTO: 0.8 %
BILIRUB SERPL-MCNC: 0.5 MG/DL (ref 0.2–1.2)
BUN SERPL-MCNC: 15 MG/DL (ref 7–25)
BUN/CREAT SERPL: NORMAL (CALC) (ref 6–22)
CALCIUM SERPL-MCNC: 9.5 MG/DL (ref 8.6–10.4)
CHLORIDE SERPL-SCNC: 105 MMOL/L (ref 98–110)
CO2 SERPL-SCNC: 29 MMOL/L (ref 20–32)
CREAT SERPL-MCNC: 0.59 MG/DL (ref 0.5–1.05)
EOSINOPHIL # BLD AUTO: 120 CELLS/UL (ref 15–500)
EOSINOPHIL NFR BLD AUTO: 2.5 %
ERYTHROCYTE [DISTWIDTH] IN BLOOD BY AUTOMATED COUNT: 14.3 % (ref 11–15)
GFR/BSA.PRED SERPLBLD CYS-BASED-ARV: 99 ML/MIN/1.73M2
GLOBULIN SER CALC-MCNC: 2.8 G/DL (CALC) (ref 1.9–3.7)
GLUCOSE SERPL-MCNC: 92 MG/DL (ref 65–99)
HCT VFR BLD AUTO: 43.6 % (ref 35–45)
HGB BLD-MCNC: 14.5 G/DL (ref 11.7–15.5)
LYMPHOCYTES # BLD AUTO: 1272 CELLS/UL (ref 850–3900)
LYMPHOCYTES NFR BLD AUTO: 26.5 %
MCH RBC QN AUTO: 31.5 PG (ref 27–33)
MCHC RBC AUTO-ENTMCNC: 33.3 G/DL (ref 32–36)
MCV RBC AUTO: 94.6 FL (ref 80–100)
MONOCYTES # BLD AUTO: 408 CELLS/UL (ref 200–950)
MONOCYTES NFR BLD AUTO: 8.5 %
NEUTROPHILS # BLD AUTO: 2962 CELLS/UL (ref 1500–7800)
NEUTROPHILS NFR BLD AUTO: 61.7 %
PLATELET # BLD AUTO: 289 THOUSAND/UL (ref 140–400)
PMV BLD REES-ECKER: 10.3 FL (ref 7.5–12.5)
POTASSIUM SERPL-SCNC: 4.3 MMOL/L (ref 3.5–5.3)
PROT SERPL-MCNC: 7.2 G/DL (ref 6.1–8.1)
RBC # BLD AUTO: 4.61 MILLION/UL (ref 3.8–5.1)
SODIUM SERPL-SCNC: 141 MMOL/L (ref 135–146)
WBC # BLD AUTO: 4.8 THOUSAND/UL (ref 3.8–10.8)

## 2024-08-19 DIAGNOSIS — M33.13 DERMATOMYOSITIS (HCC): ICD-10-CM

## 2024-08-19 RX ORDER — METHOTREXATE 2.5 MG/1
TABLET ORAL
Qty: 90 TABLET | Refills: 0 | Status: SHIPPED | OUTPATIENT
Start: 2024-08-19

## 2024-08-19 RX ORDER — CLOBETASOL PROPIONATE 0.5 MG/ML
SOLUTION TOPICAL 2 TIMES DAILY
Qty: 50 ML | Refills: 5 | Status: SHIPPED | OUTPATIENT
Start: 2024-08-19

## 2024-08-19 RX ORDER — MINOXIDIL 2.5 MG/1
1.25 TABLET ORAL DAILY
Qty: 90 TABLET | Refills: 1 | Status: SHIPPED | OUTPATIENT
Start: 2024-08-19

## 2024-09-04 DIAGNOSIS — M33.13 DERMATOMYOSITIS (HCC): ICD-10-CM

## 2024-09-07 RX ORDER — FOLIC ACID 1 MG/1
TABLET ORAL
Qty: 90 TABLET | Refills: 3 | OUTPATIENT
Start: 2024-09-07

## 2024-10-02 DIAGNOSIS — M33.13 DERMATOMYOSITIS (HCC): ICD-10-CM

## 2024-10-02 RX ORDER — FOLIC ACID 1 MG/1
TABLET ORAL
Qty: 90 TABLET | Refills: 3 | Status: SHIPPED | OUTPATIENT
Start: 2024-10-02

## 2024-10-17 DIAGNOSIS — M33.13 DERMATOMYOSITIS (HCC): ICD-10-CM

## 2024-10-20 RX ORDER — METHOTREXATE 2.5 MG/1
TABLET ORAL
Refills: 0 | OUTPATIENT
Start: 2024-10-20

## 2024-10-21 DIAGNOSIS — M33.13 DERMATOMYOSITIS (HCC): ICD-10-CM

## 2024-10-21 RX ORDER — METHOTREXATE 2.5 MG/1
TABLET ORAL
Qty: 90 TABLET | Refills: 5 | Status: SHIPPED | OUTPATIENT
Start: 2024-10-21

## 2024-11-14 LAB
ALBUMIN SERPL-MCNC: 4.4 G/DL (ref 3.6–5.1)
ALBUMIN/GLOB SERPL: 1.6 (CALC) (ref 1–2.5)
ALP SERPL-CCNC: 90 U/L (ref 37–153)
ALT SERPL-CCNC: 10 U/L (ref 6–29)
AST SERPL-CCNC: 15 U/L (ref 10–35)
BASOPHILS # BLD AUTO: 52 CELLS/UL (ref 0–200)
BASOPHILS NFR BLD AUTO: 1.1 %
BILIRUB SERPL-MCNC: 0.5 MG/DL (ref 0.2–1.2)
BUN SERPL-MCNC: 17 MG/DL (ref 7–25)
BUN/CREAT SERPL: ABNORMAL (CALC) (ref 6–22)
CALCIUM SERPL-MCNC: 9.7 MG/DL (ref 8.6–10.4)
CHLORIDE SERPL-SCNC: 104 MMOL/L (ref 98–110)
CO2 SERPL-SCNC: 31 MMOL/L (ref 20–32)
CREAT SERPL-MCNC: 0.52 MG/DL (ref 0.5–1.05)
EOSINOPHIL # BLD AUTO: 122 CELLS/UL (ref 15–500)
EOSINOPHIL NFR BLD AUTO: 2.6 %
ERYTHROCYTE [DISTWIDTH] IN BLOOD BY AUTOMATED COUNT: 14 % (ref 11–15)
GFR/BSA.PRED SERPLBLD CYS-BASED-ARV: 101 ML/MIN/1.73M2
GLOBULIN SER CALC-MCNC: 2.8 G/DL (CALC) (ref 1.9–3.7)
GLUCOSE SERPL-MCNC: 105 MG/DL (ref 65–99)
HCT VFR BLD AUTO: 41.7 % (ref 35–45)
HGB BLD-MCNC: 14.2 G/DL (ref 11.7–15.5)
LYMPHOCYTES # BLD AUTO: 1090 CELLS/UL (ref 850–3900)
LYMPHOCYTES NFR BLD AUTO: 23.2 %
MCH RBC QN AUTO: 31.4 PG (ref 27–33)
MCHC RBC AUTO-ENTMCNC: 34.1 G/DL (ref 32–36)
MCV RBC AUTO: 92.3 FL (ref 80–100)
MONOCYTES # BLD AUTO: 451 CELLS/UL (ref 200–950)
MONOCYTES NFR BLD AUTO: 9.6 %
NEUTROPHILS # BLD AUTO: 2985 CELLS/UL (ref 1500–7800)
NEUTROPHILS NFR BLD AUTO: 63.5 %
PLATELET # BLD AUTO: 278 THOUSAND/UL (ref 140–400)
PMV BLD REES-ECKER: 11.3 FL (ref 7.5–12.5)
POTASSIUM SERPL-SCNC: 4.3 MMOL/L (ref 3.5–5.3)
PROT SERPL-MCNC: 7.2 G/DL (ref 6.1–8.1)
RBC # BLD AUTO: 4.52 MILLION/UL (ref 3.8–5.1)
SODIUM SERPL-SCNC: 142 MMOL/L (ref 135–146)
WBC # BLD AUTO: 4.7 THOUSAND/UL (ref 3.8–10.8)

## 2025-01-02 ENCOUNTER — OFFICE VISIT (OUTPATIENT)
Dept: DERMATOLOGY | Facility: CLINIC | Age: 70
End: 2025-01-02
Payer: MEDICARE

## 2025-01-02 VITALS — HEIGHT: 58 IN | WEIGHT: 140.4 LBS | BODY MASS INDEX: 29.47 KG/M2 | TEMPERATURE: 98.4 F

## 2025-01-02 DIAGNOSIS — B02.9 HERPES ZOSTER WITHOUT COMPLICATION: Primary | ICD-10-CM

## 2025-01-02 PROCEDURE — 99214 OFFICE O/P EST MOD 30 MIN: CPT | Performed by: DERMATOLOGY

## 2025-01-02 RX ORDER — VALACYCLOVIR HYDROCHLORIDE 1 G/1
1000 TABLET, FILM COATED ORAL 3 TIMES DAILY
Qty: 30 TABLET | Refills: 0 | Status: SHIPPED | OUTPATIENT
Start: 2025-01-02 | End: 2025-01-12

## 2025-01-02 RX ORDER — GABAPENTIN 300 MG/1
300 CAPSULE ORAL 3 TIMES DAILY
Qty: 90 CAPSULE | Refills: 1 | Status: SHIPPED | OUTPATIENT
Start: 2025-01-02

## 2025-01-02 NOTE — PROGRESS NOTES
"[facility] Note     Patient Name: Rozina Siegel  Encounter Date: 1/2/25     Have you been cared for by a [facility] Dermatologist in the last 3 years and, if so, which description applies to you?    Yes.  I have been here within the last 3 years, and my medical history has NOT changed since that time.  I am FEMALE/of child-bearing potential.    REVIEW OF SYSTEMS:  Have you recently had or currently have any of the following? No changes in my recent health.   PAST MEDICAL HISTORY:  Have you personally ever had or currently have any of the following?  If \"YES,\" then please provide more detail. No changes in my medical history.   HISTORY OF IMMUNOSUPPRESSION: Do you have a history of any of the following:  Systemic Immunosuppression such as Diabetes, Biologic or Immunotherapy, Chemotherapy, Organ Transplantation, Bone Marrow Transplantation or Prednisone?  No     Answering \"YES\" requires the addition of the dotphrase \"IMMUNOSUPPRESSED\" as the first diagnosis of the patient's visit.   FAMILY HISTORY:  Any \"first degree relatives\" (parent, brother, sister, or child) with the following?    No changes in my family's known health.   PATIENT EXPERIENCE:    Do you want the Dermatologist to perform a COMPLETE skin exam today including a clinical examination under the \"bra and underwear\" areas?  NO  If necessary, do we have your permission to call and leave a detailed message on your Preferred Phone number that includes your specific medical information?  Yes      Allergies   Allergen Reactions    Hydroxychloroquine Rash      Current Outpatient Medications:     betamethasone, augmented, (DIPROLENE) 0.05 % ointment, Apply topically 2 (two) times a day To fingers as needed, Disp: 50 g, Rfl: 0    calcium-vitamin D (OSCAL) 250-125 MG-UNIT per tablet, Take 1 tablet by mouth daily, Disp: , Rfl:     cholecalciferol (VITAMIN D3) 400 units tablet, Take 400 Units by mouth daily, Disp: , Rfl:     clobetasol (TEMOVATE) " "0.05 % external solution, Apply topically 2 (two) times a day To scalp as needed, Disp: 50 mL, Rfl: 5    folic acid (FOLVITE) 1 mg tablet, TAKE 1 TABLET BY MOUTH DAILY, Disp: 90 tablet, Rfl: 3    hydrocortisone 2.5 % lotion, Apply 1 application topically 2 (two) times a day As needed, Disp: , Rfl:     levothyroxine 125 mcg tablet, Take 125 mcg by mouth daily, Disp: , Rfl:     methotrexate 2.5 MG tablet, TAKE 9 TABLETS BY MOUTH WEEKLY, Disp: 90 tablet, Rfl: 5    minoxidil (LONITEN) 2.5 mg tablet, Take 0.5 tablets (1.25 mg total) by mouth daily, Disp: 90 tablet, Rfl: 1    pyridoxine (B-6) 250 MG tablet, Take 250 mg by mouth daily, Disp: , Rfl:     raloxifene (EVISTA) 60 mg tablet, Take 60 mg by mouth daily, Disp: , Rfl:     triamcinolone (KENALOG) 0.1 % cream, , Disp: , Rfl:     triamcinolone (KENALOG) 0.1 % ointment, Apply 1 application topically 2 (two) times a day To rash on face and body, Disp: 454 g, Rfl: 1          Whom besides the patient is providing clinical information about today's encounter?   NO ADDITIONAL HISTORIAN (patient alone provided history)    Physical Exam and Assessment/Plan by Diagnosis:    HERPES ZOSTER (\"SHINGLES\")    Physical Exam:  Anatomic Location Affected:  left upper chest and arm  Morphological Description:  vesicles overlying pink plaque, papules  Pertinent Positives:  Pertinent Negatives:    Additional History of Present Condition:  new onset, patient stated she felt soreness on the left arm on Sunday     Assessment and Plan:  Based on a thorough discussion of this condition and the management approach to it (including a comprehensive discussion of the known risks, side effects and potential benefits of treatment), the patient (family) agrees to implement the following specific plan:  Oral Valacyclovir 1000 mg take 1 tablet by mouth 3 times daily for 10 days.  Gabapentin 300 mg take 1 tablet by mouth 3 times daily. Okay to start with 1 tablet nightly.  Advised to avoid bodily contact " keep covered until crusted over, good hand washing    What is herpes zoster?  Herpes zoster is a localized, blistering and painful rash caused by reactivation of varicella-zoster virus (VZV). Herpes zoster is characterized by dermatomal distribution,that is the blisters are confined to the cutaneous distribution of one or two adjacent sensory nerves. This is usually unilateral, with a sharp cut-off at the anterior and posterior midlines.  Herpes zoster is also called shingles.    VZV is also called herpesvirus 3 and is a member of the Herpes virales order of double-stranded DNA viruses.    Who gets herpes zoster?  Anyone that has previously had varicella (chickenpox) may subsequently develop zoster. Zoster can occur in childhood but is much more common in adults, especially older people. People with various kinds of cancer have a 40% increased risk of developing zoster. People who have had zoster rarely get it again; the chance of getting a second episode is about 1%.  Herpes zoster often affects people with weak immunity.    What causes herpes zoster?  After primary infection--varicella--VZV remains dormant in dorsal root ganglia nerve cells in the spine for years before it is reactivated and migrates down sensory nerves to the skin to cause herpes zoster.  It is not clear why herpes zoster affects a particular nerve fiber. Triggering factors are sometimes recognized, such as:  Pressure on the nerve roots   Radiotherapy at the level of the affected nerve root   Spinal surgery   An infection   An injury (not necessarily to the spine)   Contact with someone with varicella or herpes zoster    What are the clinical features of herpes zoster?  The clinical presentation of herpes zoster depends on the age and health of the patient and which dermatome is affected.  The first sign of herpes zoster is usually pain, which may be severe, relating to one or more sensory nerves. The pain may be just in one spot, or it may  "spread out. The patient usually feels quite unwell with fever and headache. The lymph nodes draining the affected area are often enlarged and tender.    Within one to three days of the onset of pain, a blistering rash appears in the painful area of skin. It starts as a crop of red papules. New lesions continue to erupt for several days within the distribution of the affected nerve, each blistering or becoming pustular then crusting over.    The chest (thoracic), neck (cervical), forehead (ophthalmic) and lumbar/sacral sensory nerve supply regions are most commonly affected at all ages. The frequency of ophthalmic herpes zoster increases with age. Herpes zoster occasionally causes blisters inside the mouth or ears, and can also affect the genital area. Sometimes there is pain without rash--herpes zoster \"sine eruptione\"--or rash without pain, most often in children.    Pain and general symptoms subside gradually as the eruption disappears. In uncomplicated cases, recovery is complete within 2-3 weeks in children and young adults, and within 3-4 weeks in older patients.    What are the complications of herpes zoster?  Herpes zoster may cause:  Involvement of several dermatomes, or sometimes, bilateral eruptions in unique dermatomes   Eye complications when the ophthalmic division of the first cranial nerve is involved   Deep blisters that destroy the skin, taking weeks to heal followed by scarring   Muscle weakness in about one in 20 patients. Facial nerve palsy is the most common result (see [place] Hunt syndrome). There is a 50% chance of complete recovery, but some improvement can be expected in nearly all cases   Infection of internal organs, including the gastrointestinal tract, lungs and brain (encephalitis)  Herpes zoster in the early months of pregnancy can harm the fetus, but luckily this is rare. The fetus may be infected by chickenpox in later pregnancy, and then develop herpes zoster as an " infant.    Post-herpetic neuralgia  Post-herpetic neuralgia is defined as persistence or recurrence of pain in the same area, more than a month after the onset of herpes zoster. It becomes increasingly common with age, affecting about a third of patients over 40. It is particularly likely if there is facial infection. Post-herpetic neuralgia may be a continuous burning sensation with increased sensitivity in the affected areas or spasmodic shooting pain. The overlying skin is often numb or exquisitely sensitive to touch. Sometimes, instead of pain, the neuralgia results in a persistent itch (neuropathic pruritus).    What is the treatment of herpes zoster?  Antiviral treatment can reduce pain and the duration of symptoms if started within one to three days after the onset of herpes zoster. Aciclovir 800 mg 5 times daily for seven days is most often prescribed. Valaciclovir and famciclovir are also useful. The efficacy of prescribing systemic steroids is unproven.    Note that herpes zoster is infectious to people who have not previously had chickenpox.  Management of acute herpes zoster may include:  Rest and pain relief   Protective ointment applied to the rash, such as petroleum jelly.   Oral antibiotics for secondary infection    Post-herpetic neuralgia may be difficult to treat successfully. It may respond to any of the following.  Early use of antiviral medication   Local anaesthetic applications   Topical capsaicin   Tricyclic antidepressant medications such as amitriptyline   Anti-epileptic medications gabapentin and pregabalin   Transcutaneous electrical nerve stimulation or acupuncture   Botulinum toxin into the affected area  Nonsteroidal anti-inflammatories and opioids are generally unhelpful.    Prevention of herpes zoster  Because the risk of severe complications from herpes zoster is more likely in older people, those aged over 60 years might consider the zoster vaccine, which can reduce the incidence  of herpes zoster by half. In people who do get herpes zoster despite being vaccinated, the symptoms are usually less severe, and post-herpetic neuralgia is less likely to develop. In [place], the zoster vaccine will be funded from 1 April 2018 for people aged between 66 and 80 years old.    Herpes zoster vaccination is contraindicated in immune suppressed patients due to the risk of it causing disseminated herpes zoster infection.     Scribe Attestation      I,:  Demetria Asif MA am acting as a scribe while in the presence of the attending physician.:       I,:  Carole Jaquez MD personally performed the services described in this documentation    as scribed in my presence.:

## 2025-01-02 NOTE — PATIENT INSTRUCTIONS
"HERPES ZOSTER (\"SHINGLES\")      Assessment and Plan:  Based on a thorough discussion of this condition and the management approach to it (including a comprehensive discussion of the known risks, side effects and potential benefits of treatment), the patient (family) agrees to implement the following specific plan:  Oral Valacyclovir 1000 mg take 1 tablet by mouth 3 times daily for 10 days.  Gabapentin 300 mg take 1 tablet by mouth 3 times daily. Okay to take 1 tablet nightly.  Advised to avoid bodily contact keep covered until crusted over, good hand washing    What is herpes zoster?  Herpes zoster is a localized, blistering and painful rash caused by reactivation of varicella-zoster virus (VZV). Herpes zoster is characterized by dermatomal distribution,that is the blisters are confined to the cutaneous distribution of one or two adjacent sensory nerves. This is usually unilateral, with a sharp cut-off at the anterior and posterior midlines.  Herpes zoster is also called shingles.    VZV is also called herpesvirus 3 and is a member of the Herpes virales order of double-stranded DNA viruses.    Who gets herpes zoster?  Anyone that has previously had varicella (chickenpox) may subsequently develop zoster. Zoster can occur in childhood but is much more common in adults, especially older people. People with various kinds of cancer have a 40% increased risk of developing zoster. People who have had zoster rarely get it again; the chance of getting a second episode is about 1%.  Herpes zoster often affects people with weak immunity.    What causes herpes zoster?  After primary infection--varicella--VZV remains dormant in dorsal root ganglia nerve cells in the spine for years before it is reactivated and migrates down sensory nerves to the skin to cause herpes zoster.  It is not clear why herpes zoster affects a particular nerve fiber. Triggering factors are sometimes recognized, such as:  Pressure on the nerve roots " "  Radiotherapy at the level of the affected nerve root   Spinal surgery   An infection   An injury (not necessarily to the spine)   Contact with someone with varicella or herpes zoster    What are the clinical features of herpes zoster?  The clinical presentation of herpes zoster depends on the age and health of the patient and which dermatome is affected.  The first sign of herpes zoster is usually pain, which may be severe, relating to one or more sensory nerves. The pain may be just in one spot, or it may spread out. The patient usually feels quite unwell with fever and headache. The lymph nodes draining the affected area are often enlarged and tender.    Within one to three days of the onset of pain, a blistering rash appears in the painful area of skin. It starts as a crop of red papules. New lesions continue to erupt for several days within the distribution of the affected nerve, each blistering or becoming pustular then crusting over.    The chest (thoracic), neck (cervical), forehead (ophthalmic) and lumbar/sacral sensory nerve supply regions are most commonly affected at all ages. The frequency of ophthalmic herpes zoster increases with age. Herpes zoster occasionally causes blisters inside the mouth or ears, and can also affect the genital area. Sometimes there is pain without rash--herpes zoster \"sine eruptione\"--or rash without pain, most often in children.    Pain and general symptoms subside gradually as the eruption disappears. In uncomplicated cases, recovery is complete within 2-3 weeks in children and young adults, and within 3-4 weeks in older patients.    What are the complications of herpes zoster?  Herpes zoster may cause:  Involvement of several dermatomes, or sometimes, bilateral eruptions in unique dermatomes   Eye complications when the ophthalmic division of the first cranial nerve is involved   Deep blisters that destroy the skin, taking weeks to heal followed by scarring   Muscle " weakness in about one in 20 patients. Facial nerve palsy is the most common result (see Daisy Hunt syndrome). There is a 50% chance of complete recovery, but some improvement can be expected in nearly all cases   Infection of internal organs, including the gastrointestinal tract, lungs and brain (encephalitis)  Herpes zoster in the early months of pregnancy can harm the fetus, but luckily this is rare. The fetus may be infected by chickenpox in later pregnancy, and then develop herpes zoster as an infant.    Post-herpetic neuralgia  Post-herpetic neuralgia is defined as persistence or recurrence of pain in the same area, more than a month after the onset of herpes zoster. It becomes increasingly common with age, affecting about a third of patients over 40. It is particularly likely if there is facial infection. Post-herpetic neuralgia may be a continuous burning sensation with increased sensitivity in the affected areas or spasmodic shooting pain. The overlying skin is often numb or exquisitely sensitive to touch. Sometimes, instead of pain, the neuralgia results in a persistent itch (neuropathic pruritus).    What is the treatment of herpes zoster?  Antiviral treatment can reduce pain and the duration of symptoms if started within one to three days after the onset of herpes zoster. Aciclovir 800 mg 5 times daily for seven days is most often prescribed. Valaciclovir and famciclovir are also useful. The efficacy of prescribing systemic steroids is unproven.    Note that herpes zoster is infectious to people who have not previously had chickenpox.  Management of acute herpes zoster may include:  Rest and pain relief   Protective ointment applied to the rash, such as petroleum jelly.   Oral antibiotics for secondary infection    Post-herpetic neuralgia may be difficult to treat successfully. It may respond to any of the following.  Early use of antiviral medication   Local anaesthetic applications   Topical capsaicin    Tricyclic antidepressant medications such as amitriptyline   Anti-epileptic medications gabapentin and pregabalin   Transcutaneous electrical nerve stimulation or acupuncture   Botulinum toxin into the affected area  Nonsteroidal anti-inflammatories and opioids are generally unhelpful.    Prevention of herpes zoster  Because the risk of severe complications from herpes zoster is more likely in older people, those aged over 60 years might consider the zoster vaccine, which can reduce the incidence of herpes zoster by half. In people who do get herpes zoster despite being vaccinated, the symptoms are usually less severe, and post-herpetic neuralgia is less likely to develop. In [place], the zoster vaccine will be funded from 1 April 2018 for people aged between 66 and 80 years old.    Herpes zoster vaccination is contraindicated in immune suppressed patients due to the risk of it causing disseminated herpes zoster infection.

## 2025-01-02 NOTE — LETTER
Date: 1/2/2025    To whom it may concern:     This is to certify that Rozina Siegel has been under my care. patient is contagious for medical reasons. Please excuse from Jury Duty.            Sincerely,  Carole Jaquez MD.

## 2025-03-13 ENCOUNTER — OFFICE VISIT (OUTPATIENT)
Dept: DERMATOLOGY | Facility: CLINIC | Age: 70
End: 2025-03-13
Payer: MEDICARE

## 2025-03-13 DIAGNOSIS — D22.9 NEVUS: ICD-10-CM

## 2025-03-13 DIAGNOSIS — L81.4 LENTIGO: ICD-10-CM

## 2025-03-13 DIAGNOSIS — L82.1 SEBORRHEIC KERATOSES: ICD-10-CM

## 2025-03-13 DIAGNOSIS — D18.01 CHERRY ANGIOMA: ICD-10-CM

## 2025-03-13 DIAGNOSIS — L85.3 XEROSIS OF SKIN: ICD-10-CM

## 2025-03-13 DIAGNOSIS — M33.13 DERMATOMYOSITIS (HCC): Primary | ICD-10-CM

## 2025-03-13 PROCEDURE — 99213 OFFICE O/P EST LOW 20 MIN: CPT | Performed by: DERMATOLOGY

## 2025-03-13 NOTE — PROGRESS NOTES
"[facility] Note     Patient Name: Rozina Siegel  Encounter Date: 03/13/2025     Have you been cared for by a [facility] Dermatologist in the last 3 years and, if so, which description applies to you?    Yes.  I have been here within the last 3 years, and my medical history has NOT changed since that time.  I am FEMALE/of child-bearing potential.    REVIEW OF SYSTEMS:  Have you recently had or currently have any of the following? No changes in my recent health.   PAST MEDICAL HISTORY:  Have you personally ever had or currently have any of the following?  If \"YES,\" then please provide more detail. No changes in my medical history.   HISTORY OF IMMUNOSUPPRESSION: Do you have a history of any of the following:  Systemic Immunosuppression such as Diabetes, Biologic or Immunotherapy, Chemotherapy, Organ Transplantation, Bone Marrow Transplantation or Prednisone?  No     Answering \"YES\" requires the addition of the dotphrase \"IMMUNOSUPPRESSED\" as the first diagnosis of the patient's visit.   FAMILY HISTORY:  Any \"first degree relatives\" (parent, brother, sister, or child) with the following?    No changes in my family's known health.   PATIENT EXPERIENCE:    Do you want the Dermatologist to perform a COMPLETE skin exam today including a clinical examination under the \"bra and underwear\" areas?  Yes not under underwear   If necessary, do we have your permission to call and leave a detailed message on your Preferred Phone number that includes your specific medical information?  Yes      Allergies   Allergen Reactions    Hydroxychloroquine Rash      Current Outpatient Medications:     betamethasone, augmented, (DIPROLENE) 0.05 % ointment, Apply topically 2 (two) times a day To fingers as needed (Patient not taking: Reported on 1/2/2025), Disp: 50 g, Rfl: 0    calcium-vitamin D (OSCAL) 250-125 MG-UNIT per tablet, Take 1 tablet by mouth daily, Disp: , Rfl:     cholecalciferol (VITAMIN D3) 400 units tablet, Take " 400 Units by mouth daily, Disp: , Rfl:     clobetasol (TEMOVATE) 0.05 % external solution, Apply topically 2 (two) times a day To scalp as needed, Disp: 50 mL, Rfl: 5    folic acid (FOLVITE) 1 mg tablet, TAKE 1 TABLET BY MOUTH DAILY, Disp: 90 tablet, Rfl: 3    gabapentin (Neurontin) 300 mg capsule, Take 1 capsule (300 mg total) by mouth 3 (three) times a day, Disp: 90 capsule, Rfl: 1    hydrocortisone 2.5 % lotion, Apply 1 application topically 2 (two) times a day As needed, Disp: , Rfl:     levothyroxine 125 mcg tablet, Take 125 mcg by mouth daily (Patient not taking: Reported on 1/2/2025), Disp: , Rfl:     methotrexate 2.5 MG tablet, TAKE 9 TABLETS BY MOUTH WEEKLY, Disp: 90 tablet, Rfl: 5    minoxidil (LONITEN) 2.5 mg tablet, Take 0.5 tablets (1.25 mg total) by mouth daily, Disp: 90 tablet, Rfl: 1    pyridoxine (B-6) 250 MG tablet, Take 250 mg by mouth daily (Patient not taking: Reported on 1/2/2025), Disp: , Rfl:     raloxifene (EVISTA) 60 mg tablet, Take 60 mg by mouth daily (Patient not taking: Reported on 1/2/2025), Disp: , Rfl:     triamcinolone (KENALOG) 0.1 % cream, , Disp: , Rfl:     triamcinolone (KENALOG) 0.1 % ointment, Apply 1 application topically 2 (two) times a day To rash on face and body, Disp: 454 g, Rfl: 1    valACYclovir (VALTREX) 1,000 mg tablet, Take 1 tablet (1,000 mg total) by mouth 3 (three) times a day for 10 days, Disp: 30 tablet, Rfl: 0          Whom besides the patient is providing clinical information about today's encounter?   NO ADDITIONAL HISTORIAN (patient alone provided history)    Physical Exam and Assessment/Plan by Diagnosis:    DERMATOMYOSITIS    Physical Exam:  Anatomic Location Affected:  face, arms,  left upper trunk, scalp   Morphological Description:  diffuse erythema and erythematous plaques  Pertinent Positives:  Pertinent Negatives: no muscle weakness     Additional History of Present Condition:  patient is present for follow up, patient is currently methotrexate  22.5mg  weekly and folic acid.  Patient applies clobetasol 0.05% solution to scalp for itch a couple of times a week. Patient states she does not have any muscle pain or lung issues.     Patient had rash reaction to hydroxychloroquine and has stopped.   2022 CT A/P was negative; ovarian ultrasound was remarkable for a scar- thinks this may be from having a fibroidectomy in her fallopian tubes/ovaries years ago     Assessment and Plan:  Based on a thorough discussion of this condition and the management approach to it (including a comprehensive discussion of the known risks, side effects and potential benefits of treatment), the patient (family) agrees to implement the following specific plan:  Can continue clobetasol solution 0.05% solution as needed for itch on the scalp   Continue methotrexate 22.5 mg weekly  Continue folic acid   Continue sunscreen for sun protection, please apply multiple times daily if out in the sun   Can use Vaseline as needed at night  Can try triamcinolone on left shoulder area for itch twice daily during the week then break on weekends   Labs cbc and cmp every 2-3 months   If you start to experience any muscle weakness or breathing issues, please reach out to office  Please follow up with OB/GYN for possible transvaginal ultrasound  Continue routine mammogran, colonoscopy  Will hold off on imaging PET/CT scan for now; past CT normal  History of positive SRP [id number] in 2022      What causes dermatomyositis?  Dermatomyositis is a rare acquired muscle disease that is accompanied by a rash. It is one of a group of muscle diseases called inflammatory myopathies. Dermatomyositis may affect people of any race, age or sex, although it is twice as common in women than in men. The onset of the disease is most common in those aged 50-70 years.  Dermatomyositis is considered one of the connective tissue diseases, like systemic sclerosis and lupus erythematosus. Dermatomyositis is thought to be  caused by small vessel damage which in turn affects skin and muscle.  Factors that may contribute to its development are listed below.  Genetic predisposition  Underlying cancer (more likely in older people)  Autoimmune (immune reaction against self)  Infectious or toxic agents acting as triggers  Certain drugs, which include hydroxyurea, penicillamine, statins, quinidine, and phenylbutazone     What are the symptoms of dermatomyositis?    The two main groups of symptoms affect the skin and muscles. In many patients, the first sign of dermatomyositis is the presence of a symptomless, itchy or burning rash. The rash often, but not always, develops before the muscle weakness.   Reddish or bluish-purple patches mostly affect sun-exposed areas.  A violaceous (purple) rash may also affect cheeks, nose, shoulders, upper chest, elbows, and outer thighs.  Purple eyelids, which are described as heliotrope, as they resemble the heliotrope flower, Heliotropium peruvianum, which has small purple petals.  A scaly scalp and thinned out hair may occur.  Less commonly, there is poikiloderma, in which the skin is atrophic (pale, thin skin), red(dilated blood vessels) and brown (post-inflammatory pigmentation).  Purple papules or plaques are found on bony prominences, especially the knuckles (Gottron papules).  Ragged cuticles and prominent blood vessels on nail folds are best seen by capillaroscopy or dermoscopy.  In severe cases, calcinosis can occur.   It presents as hard yellow or white lumps under the skin.  These usually appear on fingers or over joints.  Sometimes these nodules may poke through the skin and ulcerate.  The ulcers may become infected.    Muscle weakness may arise at the same time as the dermatomyositis rash, or it may occur weeks, months or years later. Proximal muscles are affected, that is, those closest to the trunk (upper arms, thighs). The first indication of myositis is when the following everyday  movements become difficult.  Climbing stairs or walking  Rising from a sitting or crouching position  Lifting objects  Raising arms above the shoulders, e.g. combing hair  Difficulty swallowing (dysphagia)  Occasionally the affected muscles ache and become tender to touch.    How do we diagnose dermatomyositis?    The diagnosis of dermatomyositis is usually confirmed by the following tests.  Blood test to detect raised circulating muscle enzymes: creatine kinase (CK) and sometimes aldolase, aspartate aminotransferase (AST) and lactic dehydrogenase (LDH)  Blood test to detect autoantibodies: non-specific antinuclear antibody (XIMENA) is found in most patients, specific Anti-Mi-2 is found in one quarter and Anti-Barbie-1 in a few, usually those who have lung disease, and are diagnostic of antisynthetase syndrome.   Skin biopsy of the rash: the microscopic appearance of an interface dermatitis is similar to systemic (acute) lupus erythematosus  Biopsy of an affected muscle  Electromyography (EMG) testing  Magnetic resonance imaging (MRI) scan of muscles  In those over 60, full body examination and testing are recommended to look for underlying cancer. Cancer screenings are recommended for up to five years after the initial onset of dermatomyositis in these patients.     How do we treat dermatomyositis?    The primary aim of treatment is to control the skin disease and muscle disease. An oral corticosteroid such as prednisone in moderate to high dose is the mainstay of medical therapy and is given to slow down the rate of disease progression. Immunosuppressive or cytotoxic drugs may also be used including methotrexate, azathioprine, cyclophosphamide, ciclosporin, mycophenolate, high dose intravenous immunoglobulin and experimentally, biologics such as rituximab. Other important measures in the management of dermatomyositis include:  Diltiazem, a calcium channel blocker usually prescribed for high blood pressure, may reduce  "calcinosis  Colchicine has also been reported to reduce calcinosis  Hydroxychloroquine may reduce the photosensitive rash  Avoid excessive sun exposure and use sun protection measures, including sunscreens, to minimize the harmful effects of the sun on already damaged and photosensitive skin  Bedrest for those with severe inflammation of muscles  Physical therapy and activity to keep the muscles and joints moving  Avoid eating food before bedtime and raise the bed head for those with difficulty swallowing    Most patients will require treatment throughout their lifetime, but dermatomyositis completely resolves in about one-in-five patients. Patients who have a disease affecting their heart or lungs, or who also have underlying cancer often require more help from a multidisciplinary team.      MELANOCYTIC NEVI (\"Moles\")    Physical Exam:  Anatomic Location Affected:   Mostly on sun-exposed areas of the trunk and extremities  Morphological Description:  Scattered, 1-4mm round to ovoid, symmetrical-appearing, even bordered, skin colored to dark brown macules/papules, mostly in sun-exposed areas  Pertinent Positives:  Pertinent Negatives:    Additional History of Present Condition:      Assessment and Plan:  Based on a thorough discussion of this condition and the management approach to it (including a comprehensive discussion of the known risks, side effects and potential benefits of treatment), the patient (family) agrees to implement the following specific plan:  When outside we recommend using a wide brim hat, sunglasses, long sleeve and pants, sunscreen with SPF 30+ with reapplication every 2 hours, or SPF specific clothing   Benign, reassured  Annual skin check     Melanocytic Nevi  Melanocytic nevi (\"moles\") are tan or brown, raised or flat areas of the skin which have an increased number of melanocytes. Melanocytes are the cells in our body which make pigment and account for skin color.    Some moles are present " "at birth (I.e., \"congenital nevi\"), while others come up later in life (i.e., \"acquired nevi\").  The sun can stimulate the body to make more moles.  Sunburns are not the only thing that triggers more moles.  Chronic sun exposure can do it too.     Clinically distinguishing a healthy mole from melanoma may be difficult, even for experienced dermatologists. The \"ABCDE's\" of moles have been suggested as a means of helping to alert a person to a suspicious mole and the possible increased risk of melanoma.  The suggestions for raising alert are as follows:    Asymmetry: Healthy moles tend to be symmetric, while melanomas are often asymmetric.  Asymmetry means if you draw a line through the mole, the two halves do not match in color, size, shape, or surface texture. Asymmetry can be a result of rapid enlargement of a mole, the development of a raised area on a previously flat lesion, scaling, ulceration, bleeding or scabbing within the mole.  Any mole that starts to demonstrate \"asymmetry\" should be examined promptly by a board certified dermatologist.     Border: Healthy moles tend to have discrete, even borders.  The border of a melanoma often blends into the normal skin and does not sharply delineate the mole from normal skin.  Any mole that starts to demonstrate \"uneven borders\" should be examined promptly by a board certified dermatologist.     Color: Healthy moles tend to be one color throughout.  Melanomas tend to be made up of different colors ranging from dark black, blue, white, or red.  Any mole that demonstrates a color change should be examined promptly by a board certified dermatologist.     Diameter: Healthy moles tend to be smaller than 0.6 cm in size; an exception are \"congenital nevi\" that can be larger.  Melanomas tend to grow and can often be greater than 0.6 cm (1/4 of an inch, or the size of a pencil eraser). This is only a guideline, and many normal moles may be larger than 0.6 cm without being " "unhealthy.  Any mole that starts to change in size (small to bigger or bigger to smaller) should be examined promptly by a board certified dermatologist.     Evolving: Healthy moles tend to \"stay the same.\"  Melanomas may often show signs of change or evolution such as a change in size, shape, color, or elevation.  Any mole that starts to itch, bleed, crust, burn, hurt, or ulcerate or demonstrate a change or evolution should be examined promptly by a board certified dermatologist.        LENTIGO    Physical Exam:  Anatomic Location Affected:  trunk, arms  Morphological Description:  Light brown macules  Pertinent Positives:  Pertinent Negatives:    Additional History of Present Condition:      Assessment and Plan:  Based on a thorough discussion of this condition and the management approach to it (including a comprehensive discussion of the known risks, side effects and potential benefits of treatment), the patient (family) agrees to implement the following specific plan:  When outside we recommend using a wide brim hat, sunglasses, long sleeve and pants, sunscreen with SPF 30+ with reapplication every 2 hours, or SPF specific clothing       What is a lentigo?  A lentigo is a pigmented flat or slightly raised lesion with a clearly defined edge. Unlike an ephelis (freckle), it does not fade in the winter months. There are several kinds of lentigo.  The name lentigo originally referred to its appearance resembling a small lentil. The plural of lentigo is lentigines, although “lentigos” is also in common use.    Who gets lentigines?  Lentigines can affect males and females of all ages and races. Solar lentigines are especially prevalent in fair skinned adults. Lentigines associated with syndromes are present at birth or arise during childhood.    What causes lentigines?  Common forms of lentigo are due to exposure to ultraviolet radiation:  Sun damage including sunburn   Indoor tanning   Phototherapy, especially " "photochemotherapy (PUVA)    Ionizing radiation, eg radiation therapy, can also cause lentigines.  Several familial syndromes associated with widespread lentigines originate from mutations in Matthew-MAP kinase, mTOR signaling and PTEN pathways.    What is the treatment for lentigines?  Most lentigines are left alone. Attempts to lighten them may not be successful. The following approaches are used:  SPF 50+ broad-spectrum sunscreen   Hydroquinone bleaching cream   Alpha hydroxy acids   Vitamin C   Retinoids   Azelaic acid   Chemical peels  Individual lesions can be permanently removed using:  Cryotherapy   Intense pulsed light   Pigment lasers    How can lentigines be prevented?  Lentigines associated with exposure ultraviolet radiation can be prevented by very careful sun protection. Clothing is more successful at preventing new lentigines than are sunscreens.    What is the outlook for lentigines?  Lentigines usually persist. They may increase in number with age and sun exposure. Some in sun-protected sites may fade and disappear.    SANDHU ANGIOMAS    Physical Exam:  Anatomic Location Affected:  trunk  Morphological Description:  Scattered cherry red, 1-4 mm papules.  Pertinent Positives:  Pertinent Negatives:    Additional History of Present Condition:      Assessment and Plan:  Based on a thorough discussion of this condition and the management approach to it (including a comprehensive discussion of the known risks, side effects and potential benefits of treatment), the patient (family) agrees to implement the following specific plan:  Monitor for changes  Benign, reassured      Assessment and Plan:    Cherry angioma, also known as Caputo de Shaq spots, are benign vascular skin lesions. A \"cherry angioma\" is a firm red, blue or purple papule, 0.1-1 cm in diameter. When thrombosed, they can appear black in colour until evaluated with a dermatoscope when the red or purple colour is more easily seen. Sandhu " "angioma may develop on any part of the body but most often appear on the scalp, face, lips and trunk.  An angioma is due to proliferating endothelial cells; these are the cells that line the inside of a blood vessel.    Angiomas can arise in early life or later in life; the most common type of angioma is a cherry angioma.  Cherry angiomas are very common in males and females of any age or race. They are more noticeable in white skin than in skin of colour. They markedly increase in number from about the age of 40. There may be a family history of similar lesions. Eruptive cherry angiomas have been rarely reported to be associated with internal malignancy. The cause of angiomas is unknown. Genetic analysis of cherry angiomas has shown that they frequently carry specific somatic missense mutations in the GNAQ and GNA11 (Q209H) genes, which are involved in other vascular and melanocytic proliferations.      SEBORRHEIC KERATOSIS; NON-INFLAMED    Physical Exam:  Anatomic Location Affected:  trunk  Morphological Description:  Flat and raised, waxy, smooth to warty textured, yellow to brownish-grey to dark brown to blackish, discrete, \"stuck-on\" appearing papules.  Pertinent Positives:  Pertinent Negatives:    Additional History of Present Condition:      Assessment and Plan:  Based on a thorough discussion of this condition and the management approach to it (including a comprehensive discussion of the known risks, side effects and potential benefits of treatment), the patient (family) agrees to implement the following specific plan:  Monitor for changes  Benign, reassured      Seborrheic Keratosis  A seborrheic keratosis is a harmless warty spot that appears during adult life as a common sign of skin aging.  Seborrheic keratoses can arise on any area of skin, covered or uncovered, with the usual exception of the palms and soles. They do not arise from mucous membranes. Seborrheic keratoses can have highly variable " "appearance.      Seborrheic keratoses are extremely common. It has been estimated that over 90% of adults over the age of 60 years have one or more of them. They occur in males and females of all races, typically beginning to erupt in the 30s or 40s. They are uncommon under the age of 20 years.  The precise cause of seborrhoeic keratoses is not known.  Seborrhoeic keratoses are considered degenerative in nature. As time goes by, seborrheic keratoses tend to become more numerous. Some people inherit a tendency to develop a very large number of them; some people may have hundreds of them.      There is no easy way to remove multiple lesions on a single occasion.  Unless a specific lesion is \"inflamed\" and is causing pain or stinging/burning or is bleeding, most insurance companies do not authorize treatment.    XEROSIS (\"DRY SKIN\")    Physical Exam:  Anatomic Location Affected:  diffuse  Morphological Description:  xerosis  Pertinent Positives:  Pertinent Negatives:    Additional History of Present Condition:      Assessment and Plan:  Based on a thorough discussion of this condition and the management approach to it (including a comprehensive discussion of the known risks, side effects and potential benefits of treatment), the patient (family) agrees to implement the following specific plan:  Use moisturizer like Eucerin,Cerave or Aveeno Cream 3 times a day for the dry skin            Dry skin refers to skin that feels dry to touch. Dry skin has a dull surface with a rough, scaly quality. The skin is less pliable and cracked. When dryness is severe, the skin may become inflamed and fissured.  Although any body site can be dry, dry skin tends to affect the shins more than any other site.    Dry skin is lacking moisture in the outer horny cell layer (stratum corneum) and this results in cracks in the skin surface.  Dry skin is also called xerosis, xeroderma or asteatosis (lack of fat).  It can affect males and females " of all ages. There is some racial variability in water and lipid content of the skin.  Dry skin that starts in early childhood may be one of about 20 types of ichthyosis (fish-scale skin). There is often a family history of dry skin.   Dry skin is commonly seen in people with atopic dermatitis.  Nearly everyone > 60 years has dry skin.    Dry skin that begins later may be seen in people with certain diseases and conditions.  Postmenopausal women  Hypothyroidism  Chronic renal disease   Malnutrition and weight loss   Subclinical dermatitis   Treatment with certain drugs such as oral retinoids, diuretics and epidermal growth factor receptor inhibitors      What is the treatment for dry skin?  The mainstay of treatment of dry skin and ichthyosis is moisturisers/emollients. They should be applied liberally and often enough to:  Reduce itch   Improve the barrier function   Prevent entry of irritants, bacteria   Reduce transepidermal water loss.      How can dry skin be prevented?  Eliminate aggravating factors:  Reduce the frequency of bathing.   A humidifier in winter and air conditioner in summer   Compare having a short shower with a prolonged soak in a bath.   Use lukewarm, not hot, water.   Replace standard soap with a substitute such as a synthetic detergent cleanser, water-miscible emollient, bath oil, anti-pruritic tar oil, colloidal oatmeal etc.   Apply an emollient liberally and often, particularly shortly after bathing, and when itchy. The drier the skin, the thicker this should be, especially on the hands.    What is the outlook for dry skin?  A tendency to dry skin may persist life-long, or it may improve once contributing factors are controlled.     Scribe Attestation      I,:  Mahsa Michelle am acting as a scribe while in the presence of the attending physician.:       I,:  Carole Jaquez MD personally performed the services described in this documentation    as scribed in my presence.:

## 2025-03-13 NOTE — PATIENT INSTRUCTIONS
DERMATOMYOSITIS      Assessment and Plan:  Based on a thorough discussion of this condition and the management approach to it (including a comprehensive discussion of the known risks, side effects and potential benefits of treatment), the patient (family) agrees to implement the following specific plan:  Can continue clobetasol solution 0.05% solution as needed for itch on the scalp   Continue methotrexate 2.5 mg 6 tablets weekly  Continue folic acid   Continue sunscreen for sun protection, please apply multiple times daily if out in the sun   Can use Vaseline as needed at night  Can try triamcinolone on left shoulder area for itch twice daily during the week then break on weekends   Labs cbc and cmp every 2 months   If you start to experience any muscle pain please reach out to office  Please follow up with OB/GYN   Will hold off on imaging pat scan for now         What causes dermatomyositis?  Dermatomyositis is a rare acquired muscle disease that is accompanied by a rash. It is one of a group of muscle diseases called inflammatory myopathies. Dermatomyositis may affect people of any race, age or sex, although it is twice as common in women than in men. The onset of the disease is most common in those aged 50-70 years.  Dermatomyositis is considered one of the connective tissue diseases, like systemic sclerosis and lupus erythematosus. Dermatomyositis is thought to be caused by small vessel damage which in turn affects skin and muscle.  Factors that may contribute to its development are listed below.  Genetic predisposition  Underlying cancer (more likely in older people)  Autoimmune (immune reaction against self)  Infectious or toxic agents acting as triggers  Certain drugs, which include hydroxyurea, penicillamine, statins, quinidine, and phenylbutazone     What are the symptoms of dermatomyositis?    The two main groups of symptoms affect the skin and muscles. In many patients, the first sign of dermatomyositis  is the presence of a symptomless, itchy or burning rash. The rash often, but not always, develops before the muscle weakness.   Reddish or bluish-purple patches mostly affect sun-exposed areas.  A violaceous (purple) rash may also affect cheeks, nose, shoulders, upper chest, elbows, and outer thighs.  Purple eyelids, which are described as heliotrope, as they resemble the heliotrope flower, Heliotropium peruvianum, which has small purple petals.  A scaly scalp and thinned out hair may occur.  Less commonly, there is poikiloderma, in which the skin is atrophic (pale, thin skin), red(dilated blood vessels) and brown (post-inflammatory pigmentation).  Purple papules or plaques are found on bony prominences, especially the knuckles (Gottron papules).  Ragged cuticles and prominent blood vessels on nail folds are best seen by capillaroscopy or dermoscopy.  In severe cases, calcinosis can occur.   It presents as hard yellow or white lumps under the skin.  These usually appear on fingers or over joints.  Sometimes these nodules may poke through the skin and ulcerate.  The ulcers may become infected.    Muscle weakness may arise at the same time as the dermatomyositis rash, or it may occur weeks, months or years later. Proximal muscles are affected, that is, those closest to the trunk (upper arms, thighs). The first indication of myositis is when the following everyday movements become difficult.  Climbing stairs or walking  Rising from a sitting or crouching position  Lifting objects  Raising arms above the shoulders, e.g. combing hair  Difficulty swallowing (dysphagia)  Occasionally the affected muscles ache and become tender to touch.    How do we diagnose dermatomyositis?    The diagnosis of dermatomyositis is usually confirmed by the following tests.  Blood test to detect raised circulating muscle enzymes: creatine kinase (CK) and sometimes aldolase, aspartate aminotransferase (AST) and lactic dehydrogenase  (LDH)  Blood test to detect autoantibodies: non-specific antinuclear antibody (XIMENA) is found in most patients, specific Anti-Mi-2 is found in one quarter and Anti-Barbie-1 in a few, usually those who have lung disease, and are diagnostic of antisynthetase syndrome.   Skin biopsy of the rash: the microscopic appearance of an interface dermatitis is similar to systemic (acute) lupus erythematosus  Biopsy of an affected muscle  Electromyography (EMG) testing  Magnetic resonance imaging (MRI) scan of muscles  In those over 60, full body examination and testing are recommended to look for underlying cancer. Cancer screenings are recommended for up to five years after the initial onset of dermatomyositis in these patients.     How do we treat dermatomyositis?    The primary aim of treatment is to control the skin disease and muscle disease. An oral corticosteroid such as prednisone in moderate to high dose is the mainstay of medical therapy and is given to slow down the rate of disease progression. Immunosuppressive or cytotoxic drugs may also be used including methotrexate, azathioprine, cyclophosphamide, ciclosporin, mycophenolate, high dose intravenous immunoglobulin and experimentally, biologics such as rituximab. Other important measures in the management of dermatomyositis include:  Diltiazem, a calcium channel blocker usually prescribed for high blood pressure, may reduce calcinosis  Colchicine has also been reported to reduce calcinosis  Hydroxychloroquine may reduce the photosensitive rash  Avoid excessive sun exposure and use sun protection measures, including sunscreens, to minimize the harmful effects of the sun on already damaged and photosensitive skin  Bedrest for those with severe inflammation of muscles  Physical therapy and activity to keep the muscles and joints moving  Avoid eating food before bedtime and raise the bed head for those with difficulty swallowing    Most patients will require treatment  throughout their lifetime, but dermatomyositis completely resolves in about one-in-five patients. Patients who have a disease affecting their heart or lungs, or who also have underlying cancer often require more help from a multidisciplinary team.

## 2025-05-13 LAB — HBA1C MFR BLD HPLC: 6.3 %

## 2025-08-12 ENCOUNTER — DOCUMENTATION (OUTPATIENT)
Dept: DERMATOLOGY | Facility: CLINIC | Age: 70
End: 2025-08-12